# Patient Record
Sex: MALE
[De-identification: names, ages, dates, MRNs, and addresses within clinical notes are randomized per-mention and may not be internally consistent; named-entity substitution may affect disease eponyms.]

---

## 2018-10-30 PROBLEM — Z00.00 ENCOUNTER FOR PREVENTIVE HEALTH EXAMINATION: Status: ACTIVE | Noted: 2018-10-30

## 2018-11-21 ENCOUNTER — RECORD ABSTRACTING (OUTPATIENT)
Age: 83
End: 2018-11-21

## 2018-11-21 DIAGNOSIS — L98.9 DISORDER OF THE SKIN AND SUBCUTANEOUS TISSUE, UNSPECIFIED: ICD-10-CM

## 2018-11-21 DIAGNOSIS — R35.1 NOCTURIA: ICD-10-CM

## 2018-11-21 DIAGNOSIS — N62 HYPERTROPHY OF BREAST: ICD-10-CM

## 2018-11-21 RX ORDER — ESOMEPRAZOLE MAGNESIUM 40 MG/1
40 CAPSULE, DELAYED RELEASE ORAL DAILY
Refills: 0 | Status: ACTIVE | COMMUNITY

## 2018-11-21 RX ORDER — MULTIVITAMIN
TABLET ORAL DAILY
Refills: 0 | Status: ACTIVE | COMMUNITY

## 2018-11-21 RX ORDER — PNV NO.95/FERROUS FUM/FOLIC AC 28MG-0.8MG
100 TABLET ORAL DAILY
Refills: 0 | Status: ACTIVE | COMMUNITY

## 2018-11-21 RX ORDER — CHOLECALCIFEROL (VITAMIN D3) 50 MCG
50 MCG TABLET ORAL DAILY
Refills: 0 | Status: ACTIVE | COMMUNITY

## 2018-12-10 ENCOUNTER — APPOINTMENT (OUTPATIENT)
Dept: GERIATRICS | Facility: HOME HEALTH | Age: 83
End: 2018-12-10
Payer: MEDICARE

## 2018-12-10 PROCEDURE — 99349 HOME/RES VST EST MOD MDM 40: CPT

## 2018-12-15 VITALS
SYSTOLIC BLOOD PRESSURE: 130 MMHG | DIASTOLIC BLOOD PRESSURE: 80 MMHG | OXYGEN SATURATION: 94 % | HEART RATE: 84 BPM | WEIGHT: 173 LBS

## 2018-12-28 ENCOUNTER — APPOINTMENT (OUTPATIENT)
Dept: GERIATRICS | Facility: HOME HEALTH | Age: 83
End: 2018-12-28

## 2019-01-06 ENCOUNTER — APPOINTMENT (OUTPATIENT)
Dept: GERIATRICS | Facility: HOME HEALTH | Age: 84
End: 2019-01-06
Payer: MEDICARE

## 2019-01-06 PROCEDURE — 99349 HOME/RES VST EST MOD MDM 40: CPT

## 2019-01-07 ENCOUNTER — APPOINTMENT (OUTPATIENT)
Dept: GERIATRICS | Facility: HOME HEALTH | Age: 84
End: 2019-01-07

## 2019-01-08 ENCOUNTER — RX RENEWAL (OUTPATIENT)
Age: 84
End: 2019-01-08

## 2019-01-21 VITALS
WEIGHT: 170.5 LBS | OXYGEN SATURATION: 92 % | SYSTOLIC BLOOD PRESSURE: 140 MMHG | HEART RATE: 60 BPM | DIASTOLIC BLOOD PRESSURE: 80 MMHG

## 2019-01-21 NOTE — HISTORY OF PRESENT ILLNESS
[FreeTextEntry1] : pt is a 89 year old man\par has been irritable "terrible mood"\par psychiatrist has not been back to visit\par but psychologist is helpful\par needs renewal of xanax\par asks me to cut nails\par seeing pain dr for shot in back again tomorrow\par ?lumbar support\par \par has chronic cough\par GERD\par burning in chest\par \par \par gets constipated and then hard to urinate\par used two ducolax\par \par to get PT and speech therapist\par \par ankle edema - mild

## 2019-01-21 NOTE — ASSESSMENT
[FreeTextEntry1] : to see back pain specialist\par \par getting Pt and swallow therapy\par \par BP stable\par \par continue psych meds

## 2019-01-21 NOTE — PHYSICAL EXAM
[General Appearance - Alert] : alert [General Appearance - In No Acute Distress] : in no acute distress [General Appearance - Well Nourished] : well nourished [General Appearance - Well Developed] : well developed [General Appearance - Well-Appearing] : healthy appearing [Sclera] : the sclera and conjunctiva were normal [PERRL With Normal Accommodation] : pupils were equal in size, round, and reactive to light [Extraocular Movements] : extraocular movements were intact [Normal Oral Mucosa] : normal oral mucosa [No Oral Pallor] : no oral pallor [No Oral Cyanosis] : no oral cyanosis [Outer Ear] : the ears and nose were normal in appearance [Oropharynx] : The oropharynx was normal [Neck Appearance] : the appearance of the neck was normal [Neck Cervical Mass (___cm)] : no neck mass was observed [Jugular Venous Distention Increased] : there was no jugular-venous distention [Thyroid Diffuse Enlargement] : the thyroid was not enlarged [Thyroid Nodule] : there were no palpable thyroid nodules [Auscultation Breath Sounds / Voice Sounds] : lungs were clear to auscultation bilaterally [Heart Rate And Rhythm] : heart rate was normal and rhythm regular [Heart Sounds] : normal S1 and S2 [Heart Sounds Gallop] : no gallops [Murmurs] : no murmurs [Heart Sounds Pericardial Friction Rub] : no pericardial rub [Bowel Sounds] : normal bowel sounds [Abdomen Soft] : soft [Abdomen Tenderness] : non-tender [Abdomen Mass (___ Cm)] : no abdominal mass palpated [Skin Color & Pigmentation] : normal skin color and pigmentation [Skin Turgor] : normal skin turgor [] : no rash [FreeTextEntry1] : few bruises - toe nails clipped by me today [No Focal Deficits] : no focal deficits

## 2019-01-21 NOTE — REVIEW OF SYSTEMS
[As noted in HPI] : as noted in HPI [Cough] : cough [Heartburn] : heartburn [As Noted in HPI] : as noted in HPI [Negative] : Heme/Lymph [FreeTextEntry1] : depression

## 2019-02-01 ENCOUNTER — APPOINTMENT (OUTPATIENT)
Dept: GERIATRICS | Facility: HOME HEALTH | Age: 84
End: 2019-02-01
Payer: MEDICARE

## 2019-02-01 PROCEDURE — 99349 HOME/RES VST EST MOD MDM 40: CPT

## 2019-02-03 VITALS
DIASTOLIC BLOOD PRESSURE: 70 MMHG | OXYGEN SATURATION: 92 % | HEART RATE: 61 BPM | SYSTOLIC BLOOD PRESSURE: 140 MMHG | WEIGHT: 169.5 LBS

## 2019-02-03 NOTE — REVIEW OF SYSTEMS
[Loss Of Hearing] : hearing loss [Anxiety] : anxiety [Depression] : depression [Emotional Problems] : emotional problems [Negative] : Gastrointestinal

## 2019-02-05 NOTE — HISTORY OF PRESENT ILLNESS
[FreeTextEntry1] : pt is a 89 year old man\par has been irritable "terrible mood" - worries\par psychiatrist has not been back to visit\par but psychologist is helpful\par needs renewal of xanax\par asks me to cut nails\par saw pain dr for shot in back again - epidural and\par trigger point shot was very helpful\par went to pain management in [place] - helpful\par \par has chronic cough\par GERD\par burning in chest\par \par \par gets constipated and then hard to urinate\par used two ducolax\par \par to get PT and speech therapist\par \par ankle edema - mild

## 2019-02-05 NOTE — PHYSICAL EXAM
[General Appearance - Alert] : alert [General Appearance - In No Acute Distress] : in no acute distress [General Appearance - Well Nourished] : well nourished [General Appearance - Well Developed] : well developed [General Appearance - Well-Appearing] : healthy appearing [Sclera] : the sclera and conjunctiva were normal [PERRL With Normal Accommodation] : pupils were equal in size, round, and reactive to light [Extraocular Movements] : extraocular movements were intact [Neck Appearance] : the appearance of the neck was normal [Neck Cervical Mass (___cm)] : no neck mass was observed [Jugular Venous Distention Increased] : there was no jugular-venous distention [Thyroid Diffuse Enlargement] : the thyroid was not enlarged [Thyroid Nodule] : there were no palpable thyroid nodules [Auscultation Breath Sounds / Voice Sounds] : lungs were clear to auscultation bilaterally [Heart Rate And Rhythm] : heart rate was normal and rhythm regular [Heart Sounds] : normal S1 and S2 [Heart Sounds Gallop] : no gallops [Murmurs] : no murmurs [Heart Sounds Pericardial Friction Rub] : no pericardial rub [Bowel Sounds] : normal bowel sounds [Abdomen Soft] : soft [Abdomen Tenderness] : non-tender [Abdomen Mass (___ Cm)] : no abdominal mass palpated [Abnormal Walk] : normal gait [Nail Clubbing] : no clubbing  or cyanosis of the fingernails [Musculoskeletal - Swelling] : no joint swelling seen [Motor Tone] : muscle strength and tone were normal [Skin Color & Pigmentation] : normal skin color and pigmentation [Skin Turgor] : normal skin turgor [] : no rash [No Focal Deficits] : no focal deficits [FreeTextEntry1] : few bruises - toe nails clipped by me today

## 2019-04-12 ENCOUNTER — APPOINTMENT (OUTPATIENT)
Dept: GERIATRICS | Facility: HOME HEALTH | Age: 84
End: 2019-04-12
Payer: MEDICARE

## 2019-04-12 PROCEDURE — 99348 HOME/RES VST EST LOW MDM 30: CPT

## 2019-04-14 VITALS
SYSTOLIC BLOOD PRESSURE: 140 MMHG | WEIGHT: 173 LBS | OXYGEN SATURATION: 95 % | DIASTOLIC BLOOD PRESSURE: 80 MMHG | HEART RATE: 62 BPM

## 2019-04-14 NOTE — PHYSICAL EXAM
[General Appearance - Alert] : alert [General Appearance - In No Acute Distress] : in no acute distress [General Appearance - Well Nourished] : well nourished [General Appearance - Well Developed] : well developed [General Appearance - Well-Appearing] : healthy appearing [Sclera] : the sclera and conjunctiva were normal [PERRL With Normal Accommodation] : pupils were equal in size, round, and reactive to light [Neck Appearance] : the appearance of the neck was normal [Extraocular Movements] : extraocular movements were intact [Neck Cervical Mass (___cm)] : no neck mass was observed [Jugular Venous Distention Increased] : there was no jugular-venous distention [Thyroid Diffuse Enlargement] : the thyroid was not enlarged [Thyroid Nodule] : there were no palpable thyroid nodules [Auscultation Breath Sounds / Voice Sounds] : lungs were clear to auscultation bilaterally [Heart Rate And Rhythm] : heart rate was normal and rhythm regular [Heart Sounds] : normal S1 and S2 [Heart Sounds Gallop] : no gallops [Murmurs] : no murmurs [Heart Sounds Pericardial Friction Rub] : no pericardial rub [Bowel Sounds] : normal bowel sounds [Abdomen Soft] : soft [Abdomen Tenderness] : non-tender [Abdomen Mass (___ Cm)] : no abdominal mass palpated [No CVA Tenderness] : no ~M costovertebral angle tenderness [No Spinal Tenderness] : no spinal tenderness [Abnormal Walk] : normal gait [Nail Clubbing] : no clubbing  or cyanosis of the fingernails [Musculoskeletal - Swelling] : no joint swelling seen [Motor Tone] : muscle strength and tone were normal [Skin Color & Pigmentation] : normal skin color and pigmentation [Skin Turgor] : normal skin turgor [] : no rash [No Focal Deficits] : no focal deficits [FreeTextEntry1] : anxious, depressed

## 2019-04-14 NOTE — REVIEW OF SYSTEMS
[Feeling Poorly] : feeling poorly [Feeling Tired] : feeling tired [Loss Of Hearing] : hearing loss [Arthralgias] : arthralgias [Dizziness] : dizziness [Limb Weakness] : limb weakness [Difficulty Walking] : difficulty walking [Anxiety] : anxiety [Depression] : depression [Emotional Problems] : emotional problems [Negative] : Heme/Lymph [Recent Weight Gain (___ Lbs)] : no recent weight gain [Recent Weight Loss (___ Lbs)] : no recent weight loss

## 2019-04-14 NOTE — HISTORY OF PRESENT ILLNESS
[FreeTextEntry1] : pt is a 89 year old man\par has been irritable "terrible mood" - worries\par psychiatrist has not been back to visit\par but psychologist is helpful\par needs renewal of xanax\par asks me to cut nails\par saw pain dr for shot in back again - epidural and\par trigger point shot was very helpful\par went to pain management in [place] - helpful\par \par has chronic cough\par GERD\par burning in chest\par \par \par gets constipated and then hard to urinate\par used two ducolax\par \par to get PT and speech therapist\par \par ankle edema - mild\par \par 4/12/19\par on arrival pt c/o 20 mins of feeling tingly all over - face, extremiities\par also numbness of neck for years\par this resolved while I was there

## 2019-04-14 NOTE — ASSESSMENT
[FreeTextEntry1] : will order labs at home\par back is better\par BP stable\par \par pt is anxious and depressed\par felt better after visit\par does have psychiatrist and psychologist

## 2019-04-18 ENCOUNTER — RX RENEWAL (OUTPATIENT)
Age: 84
End: 2019-04-18

## 2019-04-21 ENCOUNTER — APPOINTMENT (OUTPATIENT)
Dept: GERIATRICS | Facility: HOME HEALTH | Age: 84
End: 2019-04-21
Payer: MEDICARE

## 2019-04-21 PROCEDURE — 99349 HOME/RES VST EST MOD MDM 40: CPT

## 2019-04-22 VITALS — OXYGEN SATURATION: 94 % | DIASTOLIC BLOOD PRESSURE: 70 MMHG | SYSTOLIC BLOOD PRESSURE: 132 MMHG | HEART RATE: 60 BPM

## 2019-04-22 RX ORDER — METOPROLOL SUCCINATE 25 MG/1
25 TABLET, EXTENDED RELEASE ORAL DAILY
Qty: 90 | Refills: 3 | Status: DISCONTINUED | COMMUNITY
End: 2019-04-22

## 2019-04-22 NOTE — REVIEW OF SYSTEMS
[SOB on Exertion] : shortness of breath during exertion [Constipation] : constipation [Arthralgias] : arthralgias [Anxiety] : anxiety [Sleep Disturbances] : sleep disturbances [Depression] : depression [Muscle Weakness] : muscle weakness [Easy Bruising] : a tendency for easy bruising [Negative] : Genitourinary

## 2019-04-22 NOTE — ASSESSMENT
[FreeTextEntry1] : reviewed medical records from [place]\par scanned in to chart\par \par checked BP with machine  obtained simiiliar readiings several times\par pt knows how to use\par \par pt had negative nuclear stress coco\par currently feels well\par echo wnl

## 2019-04-22 NOTE — HISTORY OF PRESENT ILLNESS
[FreeTextEntry1] : pt is a 89 year old man\par has been irritable "terrible mood" - worries\par psychiatrist has not been back to visit\par but psychologist is helpful\par needs renewal of xanax\par asks me to cut nails\par saw pain dr for shot in back again - epidural and\par trigger point shot was very helpful\par went to pain management in [place] - helpful\par \par has chronic cough\par GERD\par burning in chest\par \par \par gets constipated and then hard to urinate\par used two ducolax\par \par to get PT and speech therapist\par \par ankle edema - mild\par \par 4/12/19\par on arrival pt c/o 20 mins of feeling tingly all over - face, extremiities\par also numbness of neck for years\par this resolved while I was there\par \par 4/21/19\par on 4/17/19\par pt had mild chest pain 1-2/10\par aide called 911\par taken to 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

## 2019-05-24 ENCOUNTER — APPOINTMENT (OUTPATIENT)
Dept: GERIATRICS | Facility: HOME HEALTH | Age: 84
End: 2019-05-24
Payer: MEDICARE

## 2019-05-24 PROCEDURE — 99349 HOME/RES VST EST MOD MDM 40: CPT

## 2019-05-27 VITALS
OXYGEN SATURATION: 94 % | SYSTOLIC BLOOD PRESSURE: 140 MMHG | DIASTOLIC BLOOD PRESSURE: 70 MMHG | HEART RATE: 64 BPM | WEIGHT: 174.5 LBS

## 2019-05-27 NOTE — ASSESSMENT
[FreeTextEntry1] : reviewed medical records from [place]\par scanned in to chart\par \par checked BP with machine  obtained simiiliar readiings several times\par pt knows how to use\par \par pt had negative nuclear stress coco\par currently feels well\par echo wnl\par \par 5/24/19\par pt is doing well\par no chest pain\par to see cardiogist next week\par nails clipped

## 2019-05-27 NOTE — HISTORY OF PRESENT ILLNESS
[FreeTextEntry1] : pt is a 89 year old man\par has been irritable "terrible mood" - worries\par psychiatrist has not been back to visit\par but psychologist is helpful\par needs renewal of xanax\par asks me to cut nails\par saw pain dr for shot in back again - epidural and\par trigger point shot was very helpful\par went to pain management in [place] - helpful\par \par has chronic cough\par GERD\par burning in chest\par \par \par gets constipated and then hard to urinate\par used two ducolax\par \par to get PT and speech therapist\par \par ankle edema - mild\par \par 4/12/19\par on arrival pt c/o 20 mins of feeling tingly all over - face, extremiities\par also numbness of neck for years\par this resolved while I was there\par \par 4/21/19\par on 4/17/19\par pt had mild chest pain 1-2/10\par aide called 911\par taken to [facility]\par EMS noted  in and out of a fib\par at [place] NSR but elevated troponin to 1.8 peak\par no EKG changes\par BP was high they added amloidipine 5, cozaar 100 and increased toprol from 12.5 to 25 bid\par but hospital f/up yielded BP 95/60 - amlodipiine stopped, cozaar lowered to 50 and toprol back to 25 mg\par currently feels well\par \par In hospital records reviewed\par nuclear stress negative for ischemia\par Echo normal with EF 75%\par no more a fib on monitors\par \par 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

## 2019-05-27 NOTE — PHYSICAL EXAM
[General Appearance - In No Acute Distress] : in no acute distress [General Appearance - Alert] : alert [General Appearance - Well Nourished] : well nourished [General Appearance - Well-Appearing] : healthy appearing [General Appearance - Well Developed] : well developed [Extraocular Movements] : extraocular movements were intact [Sclera] : the sclera and conjunctiva were normal [PERRL With Normal Accommodation] : pupils were equal in size, round, and reactive to light [Neck Appearance] : the appearance of the neck was normal [Neck Cervical Mass (___cm)] : no neck mass was observed [Jugular Venous Distention Increased] : there was no jugular-venous distention [Outer Ear] : the ears and nose were normal in appearance [Thyroid Diffuse Enlargement] : the thyroid was not enlarged [Thyroid Nodule] : there were no palpable thyroid nodules [Auscultation Breath Sounds / Voice Sounds] : lungs were clear to auscultation bilaterally [Heart Rate And Rhythm] : heart rate was normal and rhythm regular [Heart Sounds] : normal S1 and S2 [Heart Sounds Gallop] : no gallops [Murmurs] : no murmurs [Heart Sounds Pericardial Friction Rub] : no pericardial rub [No CVA Tenderness] : no ~M costovertebral angle tenderness [Involuntary Movements] : no involuntary movements were seen [No Spinal Tenderness] : no spinal tenderness [FreeTextEntry1] : uses walker [Skin Color & Pigmentation] : normal skin color and pigmentation [Skin Turgor] : normal skin turgor [] : no rash [No Focal Deficits] : no focal deficits

## 2019-05-27 NOTE — REVIEW OF SYSTEMS
[Loss Of Hearing] : hearing loss [Difficulty Walking] : difficulty walking [Anxiety] : anxiety [Constipation] : constipation [Easy Bruising] : a tendency for easy bruising [Depression] : depression [Negative] : Integumentary [FreeTextEntry4] : uses amplifier [Easy Bleeding] : no tendency for easy bleeding

## 2019-06-28 ENCOUNTER — APPOINTMENT (OUTPATIENT)
Dept: GERIATRICS | Facility: HOME HEALTH | Age: 84
End: 2019-06-28
Payer: MEDICARE

## 2019-06-28 PROCEDURE — 99349 HOME/RES VST EST MOD MDM 40: CPT

## 2019-07-04 ENCOUNTER — RX RENEWAL (OUTPATIENT)
Age: 84
End: 2019-07-04

## 2019-07-04 VITALS
OXYGEN SATURATION: 92 % | DIASTOLIC BLOOD PRESSURE: 60 MMHG | WEIGHT: 175.8 LBS | HEART RATE: 64 BPM | SYSTOLIC BLOOD PRESSURE: 125 MMHG

## 2019-07-04 NOTE — PHYSICAL EXAM
[General Appearance - In No Acute Distress] : in no acute distress [General Appearance - Alert] : alert [General Appearance - Well Nourished] : well nourished [Sclera] : the sclera and conjunctiva were normal [General Appearance - Well Developed] : well developed [General Appearance - Well-Appearing] : healthy appearing [Extraocular Movements] : extraocular movements were intact [Outer Ear] : the ears and nose were normal in appearance [PERRL With Normal Accommodation] : pupils were equal in size, round, and reactive to light [Jugular Venous Distention Increased] : there was no jugular-venous distention [Neck Cervical Mass (___cm)] : no neck mass was observed [Neck Appearance] : the appearance of the neck was normal [Auscultation Breath Sounds / Voice Sounds] : lungs were clear to auscultation bilaterally [Thyroid Diffuse Enlargement] : the thyroid was not enlarged [Thyroid Nodule] : there were no palpable thyroid nodules [Heart Rate And Rhythm] : heart rate was normal and rhythm regular [Murmurs] : no murmurs [Heart Sounds Gallop] : no gallops [Heart Sounds] : normal S1 and S2 [Heart Sounds Pericardial Friction Rub] : no pericardial rub [Bowel Sounds] : normal bowel sounds [Abdomen Tenderness] : non-tender [Abdomen Mass (___ Cm)] : no abdominal mass palpated [Abdomen Soft] : soft [No CVA Tenderness] : no ~M costovertebral angle tenderness [No Spinal Tenderness] : no spinal tenderness [Involuntary Movements] : no involuntary movements were seen [] : no rash [Skin Turgor] : normal skin turgor [Skin Color & Pigmentation] : normal skin color and pigmentation [No Focal Deficits] : no focal deficits [FreeTextEntry1] : depressed

## 2019-07-04 NOTE — REVIEW OF SYSTEMS
[Eyesight Problems] : eyesight problems [Feeling Poorly] : feeling poorly [Sleep Disturbances] : sleep disturbances [Loss Of Hearing] : hearing loss [Difficulty Walking] : difficulty walking [Anxiety] : anxiety [Depression] : depression [Emotional Problems] : emotional problems [Muscle Weakness] : muscle weakness [Easy Bruising] : a tendency for easy bruising [Negative] : Musculoskeletal [de-identified] : sleeping a lot - depressed - dreading each day [FreeTextEntry5] : no chest pain

## 2019-07-04 NOTE — HISTORY OF PRESENT ILLNESS
[FreeTextEntry1] : pt is a 89 year old man\par has been irritable "terrible mood" - worries\par psychiatrist has not been back to visit\par but psychologist is helpful\par needs renewal of xanax\par asks me to cut nails\par saw pain dr for shot in back again - epidural and\par trigger point shot was very helpful\par went to pain management in [place] - helpful\par \par has chronic cough\par GERD\par burning in chest\par \par \par gets constipated and then hard to urinate\par used two ducolax\par \par to get PT and speech therapist\par \par ankle edema - mild\par \par 4/12/19\par on arrival pt c/o 20 mins of feeling tingly all over - face, extremiities\par also numbness of neck for years\par this resolved while I was there\par \par 4/21/19\par on 4/17/19\par pt had mild chest pain 1-2/10\par aide called 911\par taken to [facility]\par EMS noted  in and out of a fib\par at [place] NSR but elevated troponin to 1.8 peak\par no EKG changes\par BP was high they added amloidipine 5, cozaar 100 and increased toprol from 12.5 to 25 bid\par but hospital f/up yielded BP 95/60 - amlodipiine stopped, cozaar lowered to 50 and toprol back to 25 mg\par currently feels well\par \par In hospital records reviewed\par nuclear stress negative for ischemia\par Echo normal with EF 75%\par no more a fib on monitors\par \par 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\par \par 6/28/19\par pt has been more depressed\par spoke to psychiatrist who spoke to rose mary - suggested increasing effexor to 2 and 1\par asked me to prescribe\par has a psychologist comes weekly to talk\par pt is hopeless, irritable - wore headphones to dinner to block out noise

## 2019-07-04 NOTE — ASSESSMENT
[FreeTextEntry1] : reviewed medical records from [place]\par scanned in to chart\par \par checked BP with machine  obtained simiiliar readiings several times\par pt knows how to use\par \par pt had negative nuclear stress coco\par currently feels well\par echo wnl\par \par 5/24/19\par pt is doing well\par no chest pain\par to see cardiogist next week\par nails clipped\par \par 6/28/19\par pt is depressed\par will increase effexor\par

## 2019-08-08 ENCOUNTER — APPOINTMENT (OUTPATIENT)
Dept: GERIATRICS | Facility: HOME HEALTH | Age: 84
End: 2019-08-08
Payer: MEDICARE

## 2019-08-08 VITALS
DIASTOLIC BLOOD PRESSURE: 65 MMHG | OXYGEN SATURATION: 94 % | SYSTOLIC BLOOD PRESSURE: 130 MMHG | WEIGHT: 174.4 LBS | HEART RATE: 64 BPM

## 2019-08-08 DIAGNOSIS — Z86.69 PERSONAL HISTORY OF OTHER DISEASES OF THE NERVOUS SYSTEM AND SENSE ORGANS: ICD-10-CM

## 2019-08-08 DIAGNOSIS — Z98.61 CORONARY ANGIOPLASTY STATUS: ICD-10-CM

## 2019-08-08 PROCEDURE — 99349 HOME/RES VST EST MOD MDM 40: CPT

## 2019-08-08 NOTE — PHYSICAL EXAM
[General Appearance - Alert] : alert [General Appearance - In No Acute Distress] : in no acute distress [General Appearance - Well Nourished] : well nourished [General Appearance - Well Developed] : well developed [General Appearance - Well-Appearing] : healthy appearing [Sclera] : the sclera and conjunctiva were normal [PERRL With Normal Accommodation] : pupils were equal in size, round, and reactive to light [Extraocular Movements] : extraocular movements were intact [Outer Ear] : the ears and nose were normal in appearance [Neck Appearance] : the appearance of the neck was normal [Neck Cervical Mass (___cm)] : no neck mass was observed [Jugular Venous Distention Increased] : there was no jugular-venous distention [Thyroid Diffuse Enlargement] : the thyroid was not enlarged [Thyroid Nodule] : there were no palpable thyroid nodules [Auscultation Breath Sounds / Voice Sounds] : lungs were clear to auscultation bilaterally [Heart Rate And Rhythm] : heart rate was normal and rhythm regular [Heart Sounds] : normal S1 and S2 [Heart Sounds Gallop] : no gallops [Murmurs] : no murmurs [Heart Sounds Pericardial Friction Rub] : no pericardial rub [Abdomen Soft] : soft [Abdomen Tenderness] : non-tender [Abdomen Mass (___ Cm)] : no abdominal mass palpated [Involuntary Movements] : no involuntary movements were seen [Skin Color & Pigmentation] : normal skin color and pigmentation [Skin Turgor] : normal skin turgor [] : no rash [No Focal Deficits] : no focal deficits [FreeTextEntry1] : depressed

## 2019-08-08 NOTE — CURRENT MEDS
[Medication Reconciliation Completed] : Medication reconciliation completed [High Risk Medications Reviewed] : High risk medications reviewed [Delivered by local pharmacy] : delivered by local Pharmacy [Mail Order] : by mail [Reports Changes In Medication (including OTC)] : Patient reports no changes in medication [] : does not miss any medication doses [de-identified] : daughter

## 2019-08-08 NOTE — REVIEW OF SYSTEMS
[Feeling Poorly] : feeling poorly [Feeling Tired] : feeling tired [Eyesight Problems] : eyesight problems [Loss Of Hearing] : hearing loss [Heartburn] : heartburn [Hesitancy] : urinary hesitancy [Arthralgias] : arthralgias [Limb Weakness] : limb weakness [Difficulty Walking] : difficulty walking [Anxiety] : anxiety [Muscle Weakness] : muscle weakness [Easy Bruising] : a tendency for easy bruising [Negative] : Integumentary

## 2019-08-08 NOTE — HISTORY OF PRESENT ILLNESS
[FreeTextEntry1] : pt is a 89 year old man\par has been irritable "terrible mood" - worries\par psychiatrist has not been back to visit\par but psychologist is helpful\par needs renewal of xanax\par asks me to cut nails\par saw pain dr for shot in back again - epidural and\par trigger point shot was very helpful\par went to pain management in [place] - helpful\par \par has chronic cough\par GERD\par burning in chest\par \par \par gets constipated and then hard to urinate\par used two ducolax\par \par to get PT and speech therapist\par \par ankle edema - mild\par \par 4/12/19\par on arrival pt c/o 20 mins of feeling tingly all over - face, extremiities\par also numbness of neck for years\par this resolved while I was there\par \par 4/21/19\par on 4/17/19\par pt had mild chest pain 1-2/10\par aide called 911\par taken to [facility]\par EMS noted  in and out of a fib\par at [place] NSR but elevated troponin to 1.8 peak\par no EKG changes\par BP was high they added amloidipine 5, cozaar 100 and increased toprol from 12.5 to 25 bid\par but hospital f/up yielded BP 95/60 - amlodipiine stopped, cozaar lowered to 50 and toprol back to 25 mg\par currently feels well\par \par In hospital records reviewed\par nuclear stress negative for ischemia\par Echo normal with EF 75%\par no more a fib on monitors\par \par 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\par \par 6/28/19\par pt has been more depressed\par spoke to psychiatrist who spoke to rose mary - suggested increasing effexor to 2 and 1\par asked me to prescribe\par has a psychologist comes weekly to talk\par pt is hopeless, irritable - wore headphones to dinner to block out noise\par \par 8/8/19\par psych dr never followed up\par but pt feels better on new meds\par has psychologist\par no longer depressed just unhappy\par trouble walking to meals\par does not like residence\par 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

## 2019-08-08 NOTE — ASSESSMENT
[FreeTextEntry1] : reviewed medical records from [place]\par scanned in to chart\par \par checked BP with machine  obtained simiiliar readiings several times\par pt knows how to use\par \par pt had negative nuclear stress coco\par currently feels well\par echo wnl\par \par 5/24/19\par pt is doing well\par no chest pain\par to see cardiogist next week\par nails clipped\par \par 8/8/19\par doing better on increased efffexor\par BP is good\par

## 2019-09-13 ENCOUNTER — APPOINTMENT (OUTPATIENT)
Dept: GERIATRICS | Facility: HOME HEALTH | Age: 84
End: 2019-09-13
Payer: MEDICARE

## 2019-09-13 PROCEDURE — 99349 HOME/RES VST EST MOD MDM 40: CPT

## 2019-09-13 PROCEDURE — G0008: CPT

## 2019-09-13 PROCEDURE — 90662 IIV NO PRSV INCREASED AG IM: CPT

## 2019-09-15 VITALS
OXYGEN SATURATION: 92 % | RESPIRATION RATE: 83 BRPM | SYSTOLIC BLOOD PRESSURE: 138 MMHG | DIASTOLIC BLOOD PRESSURE: 70 MMHG | WEIGHT: 177.6 LBS

## 2019-09-16 NOTE — PHYSICAL EXAM
[General Appearance - Alert] : alert [General Appearance - In No Acute Distress] : in no acute distress [General Appearance - Well Nourished] : well nourished [General Appearance - Well Developed] : well developed [General Appearance - Well-Appearing] : healthy appearing [PERRL With Normal Accommodation] : pupils were equal in size, round, and reactive to light [Sclera] : the sclera and conjunctiva were normal [Extraocular Movements] : extraocular movements were intact [Outer Ear] : the ears and nose were normal in appearance [Neck Appearance] : the appearance of the neck was normal [Neck Cervical Mass (___cm)] : no neck mass was observed [Jugular Venous Distention Increased] : there was no jugular-venous distention [Thyroid Diffuse Enlargement] : the thyroid was not enlarged [Thyroid Nodule] : there were no palpable thyroid nodules [Auscultation Breath Sounds / Voice Sounds] : lungs were clear to auscultation bilaterally [Heart Rate And Rhythm] : heart rate was normal and rhythm regular [Heart Sounds Gallop] : no gallops [Heart Sounds] : normal S1 and S2 [Murmurs] : no murmurs [Heart Sounds Pericardial Friction Rub] : no pericardial rub [Abdomen Soft] : soft [Abdomen Tenderness] : non-tender [Abdomen Mass (___ Cm)] : no abdominal mass palpated [Cervical Lymph Nodes Enlarged Posterior Bilaterally] : posterior cervical [Cervical Lymph Nodes Enlarged Anterior Bilaterally] : anterior cervical [No CVA Tenderness] : no ~M costovertebral angle tenderness [Involuntary Movements] : no involuntary movements were seen [No Spinal Tenderness] : no spinal tenderness [Skin Color & Pigmentation] : normal skin color and pigmentation [Skin Turgor] : normal skin turgor [] : no rash [FreeTextEntry1] : few bruises

## 2019-09-16 NOTE — REVIEW OF SYSTEMS
[Feeling Poorly] : feeling poorly [Recent Weight Gain (___ Lbs)] : recent [unfilled] ~Ulb weight gain [Loss Of Hearing] : hearing loss [Arthralgias] : arthralgias [Limb Weakness] : limb weakness [Difficulty Walking] : difficulty walking [Sleep Disturbances] : sleep disturbances [Anxiety] : anxiety [Emotional Problems] : emotional problems [Depression] : depression [Negative] : Heme/Lymph [FreeTextEntry8] : eats prunes to help constipation [FreeTextEntry2] : eating walnuts

## 2019-09-16 NOTE — HISTORY OF PRESENT ILLNESS
[FreeTextEntry1] : pt is a 89 year old man\par has been irritable "terrible mood" - worries\par psychiatrist has not been back to visit\par but psychologist is helpful\par needs renewal of xanax\par asks me to cut nails\par saw pain dr for shot in back again - epidural and\par trigger point shot was very helpful\par went to pain management in [place] - helpful\par \par has chronic cough\par GERD\par burning in chest\par \par \par gets constipated and then hard to urinate\par used two ducolax\par \par to get PT and speech therapist\par \par ankle edema - mild\par \par 4/12/19\par on arrival pt c/o 20 mins of feeling tingly all over - face, extremiities\par also numbness of neck for years\par this resolved while I was there\par \par 4/21/19\par on 4/17/19\par pt had mild chest pain 1-2/10\par aide called 911\par taken to [facility]\par EMS noted  in and out of a fib\par at [place] NSR but elevated troponin to 1.8 peak\par no EKG changes\par BP was high they added amloidipine 5, cozaar 100 and increased toprol from 12.5 to 25 bid\par but hospital f/up yielded BP 95/60 - amlodipiine stopped, cozaar lowered to 50 and toprol back to 25 mg\par currently feels well\par \par In hospital records reviewed\par nuclear stress negative for ischemia\par Echo normal with EF 75%\par no more a fib on monitors\par \par 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\par \par 6/28/19\par pt has been more depressed\par spoke to psychiatrist who spoke to rose mary - suggested increasing effexor to 2 and 1\par asked me to prescribe\par has a psychologist comes weekly to talk\par pt is hopeless, irritable - wore headphones to dinner to block out noise\par \par 8/8/19\par psych dr never followed up\par but pt feels better on new meds\par has psychologist\par no longer depressed just unhappy\par trouble walking to meals\par does not like residence\par 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

## 2019-10-25 ENCOUNTER — APPOINTMENT (OUTPATIENT)
Dept: GERIATRICS | Facility: HOME HEALTH | Age: 84
End: 2019-10-25
Payer: MEDICARE

## 2019-10-25 DIAGNOSIS — Z86.79 PERSONAL HISTORY OF OTHER DISEASES OF THE CIRCULATORY SYSTEM: ICD-10-CM

## 2019-10-25 PROCEDURE — 99348 HOME/RES VST EST LOW MDM 30: CPT

## 2019-10-27 VITALS
DIASTOLIC BLOOD PRESSURE: 75 MMHG | OXYGEN SATURATION: 95 % | SYSTOLIC BLOOD PRESSURE: 130 MMHG | WEIGHT: 175.6 LBS | HEART RATE: 59 BPM

## 2019-10-27 PROBLEM — Z86.79 HISTORY OF CORONARY ARTERY DISEASE: Status: RESOLVED | Noted: 2018-11-21 | Resolved: 2019-10-27

## 2019-10-27 NOTE — PHYSICAL EXAM
[General Appearance - Alert] : alert [General Appearance - In No Acute Distress] : in no acute distress [General Appearance - Well Nourished] : well nourished [General Appearance - Well Developed] : well developed [General Appearance - Well-Appearing] : healthy appearing [Sclera] : the sclera and conjunctiva were normal [PERRL With Normal Accommodation] : pupils were equal in size, round, and reactive to light [Extraocular Movements] : extraocular movements were intact [Outer Ear] : the ears and nose were normal in appearance [Neck Appearance] : the appearance of the neck was normal [Neck Cervical Mass (___cm)] : no neck mass was observed [Jugular Venous Distention Increased] : there was no jugular-venous distention [Thyroid Diffuse Enlargement] : the thyroid was not enlarged [Thyroid Nodule] : there were no palpable thyroid nodules [Auscultation Breath Sounds / Voice Sounds] : lungs were clear to auscultation bilaterally [Heart Rate And Rhythm] : heart rate was normal and rhythm regular [Heart Sounds] : normal S1 and S2 [Heart Sounds Gallop] : no gallops [Murmurs] : no murmurs [Heart Sounds Pericardial Friction Rub] : no pericardial rub [Cervical Lymph Nodes Enlarged Posterior Bilaterally] : posterior cervical [Cervical Lymph Nodes Enlarged Anterior Bilaterally] : anterior cervical [No CVA Tenderness] : no ~M costovertebral angle tenderness [No Spinal Tenderness] : no spinal tenderness [Involuntary Movements] : no involuntary movements were seen [Skin Color & Pigmentation] : normal skin color and pigmentation [Skin Turgor] : normal skin turgor [] : no rash [No Focal Deficits] : no focal deficits [FreeTextEntry1] : spirits better

## 2019-10-27 NOTE — HISTORY OF PRESENT ILLNESS
[FreeTextEntry1] : pt is a 89 year old man\par has been irritable "terrible mood" - worries\par psychiatrist has not been back to visit\par but psychologist is helpful\par needs renewal of xanax\par asks me to cut nails\par saw pain dr for shot in back again - epidural and\par trigger point shot was very helpful\par went to pain management in [place] - helpful\par \par has chronic cough\par GERD\par burning in chest\par \par \par gets constipated and then hard to urinate\par used two ducolax\par \par to get PT and speech therapist\par \par ankle edema - mild\par \par 4/12/19\par on arrival pt c/o 20 mins of feeling tingly all over - face, extremiities\par also numbness of neck for years\par this resolved while I was there\par \par 4/21/19\par on 4/17/19\par pt had mild chest pain 1-2/10\par aide called 911\par taken to [facility]\par EMS noted  in and out of a fib\par at [place] NSR but elevated troponin to 1.8 peak\par no EKG changes\par BP was high they added amloidipine 5, cozaar 100 and increased toprol from 12.5 to 25 bid\par but hospital f/up yielded BP 95/60 - amlodipiine stopped, cozaar lowered to 50 and toprol back to 25 mg\par currently feels well\par \par In hospital records reviewed\par nuclear stress negative for ischemia\par Echo normal with EF 75%\par no more a fib on monitors\par \par 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\par \par 6/28/19\par pt has been more depressed\par spoke to psychiatrist who spoke to rose mary - suggested increasing effexor to 2 and 1\par asked me to prescribe\par has a psychologist comes weekly to talk\par pt is hopeless, irritable - wore headphones to dinner to block out noise\par \par 8/8/19\par psych dr never followed up\par but pt feels better on new meds\par has psychologist\par no longer depressed just unhappy\par trouble walking to meals\par does not like residence\par 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

## 2019-12-06 ENCOUNTER — RX RENEWAL (OUTPATIENT)
Age: 84
End: 2019-12-06

## 2019-12-14 ENCOUNTER — APPOINTMENT (OUTPATIENT)
Dept: GERIATRICS | Facility: HOME HEALTH | Age: 84
End: 2019-12-14
Payer: MEDICARE

## 2019-12-14 VITALS
DIASTOLIC BLOOD PRESSURE: 70 MMHG | HEART RATE: 67 BPM | SYSTOLIC BLOOD PRESSURE: 135 MMHG | WEIGHT: 177.2 LBS | OXYGEN SATURATION: 93 %

## 2019-12-14 DIAGNOSIS — R31.9 HEMATURIA, UNSPECIFIED: ICD-10-CM

## 2019-12-14 DIAGNOSIS — K59.01 SLOW TRANSIT CONSTIPATION: ICD-10-CM

## 2019-12-14 PROCEDURE — 99348 HOME/RES VST EST LOW MDM 30: CPT

## 2019-12-14 NOTE — ASSESSMENT
[FreeTextEntry1] : reviewed medical records from [place]\par scanned in to chart\par \par checked BP with machine  obtained simiiliar readiings several times\par pt knows how to use\par \par pt had negative nuclear stress coco\par currently feels well\par echo wnl\par \par 5/24/19\par pt is doing well\par no chest pain\par to see cardiogist next week\par nails clipped\par \par 8/8/19\par doing better on increased efffexor\par BP is good\par \par \par 10/25/19\par pt is doing better with mood\par BP under control\par no pain\par \par 12/14/19\par cysto - on weds\par cystology sent\par labs reviewed - stable\par no UTI\par BP normal

## 2019-12-14 NOTE — PHYSICAL EXAM
[General Appearance - Alert] : alert [General Appearance - In No Acute Distress] : in no acute distress [General Appearance - Well Nourished] : well nourished [General Appearance - Well-Appearing] : healthy appearing [General Appearance - Well Developed] : well developed [Sclera] : the sclera and conjunctiva were normal [PERRL With Normal Accommodation] : pupils were equal in size, round, and reactive to light [Extraocular Movements] : extraocular movements were intact [Outer Ear] : the ears and nose were normal in appearance [Examination Of The Oral Cavity] : the lips and gums were normal [Neck Appearance] : the appearance of the neck was normal [Neck Cervical Mass (___cm)] : no neck mass was observed [Jugular Venous Distention Increased] : there was no jugular-venous distention [Thyroid Diffuse Enlargement] : the thyroid was not enlarged [Thyroid Nodule] : there were no palpable thyroid nodules [Auscultation Breath Sounds / Voice Sounds] : lungs were clear to auscultation bilaterally [Heart Rate And Rhythm] : heart rate was normal and rhythm regular [Heart Sounds Gallop] : no gallops [Heart Sounds] : normal S1 and S2 [Murmurs] : no murmurs [Heart Sounds Pericardial Friction Rub] : no pericardial rub [Abdomen Soft] : soft [Abdomen Tenderness] : non-tender [Abdomen Mass (___ Cm)] : no abdominal mass palpated [No CVA Tenderness] : no ~M costovertebral angle tenderness [No Spinal Tenderness] : no spinal tenderness [Involuntary Movements] : no involuntary movements were seen [Skin Color & Pigmentation] : normal skin color and pigmentation [Skin Turgor] : normal skin turgor [] : no rash [No Focal Deficits] : no focal deficits [FreeTextEntry1] : anxious - felt reassured by end of visit

## 2019-12-14 NOTE — HISTORY OF PRESENT ILLNESS
[FreeTextEntry1] : pt is a 89 year old man\par has been irritable "terrible mood" - worries\par psychiatrist has not been back to visit\par but psychologist is helpful\par needs renewal of xanax\par asks me to cut nails\par saw pain dr for shot in back again - epidural and\par trigger point shot was very helpful\par went to pain management in [place] - helpful\par \par has chronic cough\par GERD\par burning in chest\par \par \par gets constipated and then hard to urinate\par used two ducolax\par \par to get PT and speech therapist\par \par ankle edema - mild\par \par 4/12/19\par on arrival pt c/o 20 mins of feeling tingly all over - face, extremiities\par also numbness of neck for years\par this resolved while I was there\par \par 4/21/19\par on 4/17/19\par pt had mild chest pain 1-2/10\par aide called 911\par taken to 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\par \par 6/28/19\par pt has been more depressed\par spoke to psychiatrist who spoke to rose mary - suggested increasing effexor to 2 and 1\par asked me to prescribe\par has a psychologist comes weekly to talk\par pt is hopeless, irritable - wore headphones to dinner to block out noise\par \par 8/8/19\par psych dr never followed up\par but pt feels better on new meds\par has psychologist\par no longer depressed just unhappy\par trouble walking to meals\par does not like residence\par 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\par \par 12/14/19\par awoke at 4 am on monday - to painless bloody urine\par no fever\par went to [place] ER - UA positive for rbcs, no bacteria - given ceftin\par friday went to  urologist - Dr Chandra\par scheduled for cystoscopy on weds\par asa on hold\par still has blood in urine\par no pain, no fever\par thinks 8-9 years ago also had cysto\par no h/o stones\par very anxious re possible cancer\par no anemia\par no weight loss\par otherwise fine

## 2020-01-12 ENCOUNTER — RX RENEWAL (OUTPATIENT)
Age: 85
End: 2020-01-12

## 2020-02-07 ENCOUNTER — APPOINTMENT (OUTPATIENT)
Dept: GERIATRICS | Facility: HOME HEALTH | Age: 85
End: 2020-02-07
Payer: MEDICARE

## 2020-02-07 DIAGNOSIS — N32.3 DIVERTICULUM OF BLADDER: ICD-10-CM

## 2020-02-07 DIAGNOSIS — F41.9 ANXIETY DISORDER, UNSPECIFIED: ICD-10-CM

## 2020-02-07 PROCEDURE — 99348 HOME/RES VST EST LOW MDM 30: CPT

## 2020-02-10 VITALS
SYSTOLIC BLOOD PRESSURE: 130 MMHG | WEIGHT: 173.4 LBS | DIASTOLIC BLOOD PRESSURE: 72 MMHG | OXYGEN SATURATION: 91 % | HEART RATE: 68 BPM

## 2020-02-10 PROBLEM — F41.9 ANXIETY: Status: ACTIVE | Noted: 2018-11-21

## 2020-02-10 PROBLEM — N32.3 BLADDER DIVERTICULUM: Status: ACTIVE | Noted: 2020-02-10

## 2020-02-10 NOTE — REVIEW OF SYSTEMS
[Loss Of Hearing] : hearing loss [Hesitancy] : urinary hesitancy [Arthralgias] : arthralgias [Anxiety] : anxiety [Depression] : depression [Muscle Weakness] : muscle weakness [Negative] : Heme/Lymph

## 2020-02-10 NOTE — ASSESSMENT
[FreeTextEntry1] : reviewed medical records from [place]\par scanned in to chart\par \par checked BP with machine  obtained simiiliar readiings several times\par pt knows how to use\par \par pt had negative nuclear stress coco\par currently feels well\par echo wnl\par \par 5/24/19\par pt is doing well\par no chest pain\par to see cardiogist next week\par nails clipped\par \par 8/8/19\par doing better on increased efffexor\par BP is good\par \par \par 10/25/19\par pt is doing better with mood\par BP under control\par no pain\par \par 12/14/19\par cysto - on weds\par cystology sent\par labs reviewed - stable\par no UTI\par BP normal\par \par 2/7/20\par cysto - on diverticuli - no more blood in urine\par BP stable\par no changes made

## 2020-02-10 NOTE — PHYSICAL EXAM
[General Appearance - Alert] : alert [General Appearance - In No Acute Distress] : in no acute distress [General Appearance - Well Nourished] : well nourished [General Appearance - Well Developed] : well developed [General Appearance - Well-Appearing] : healthy appearing [Sclera] : the sclera and conjunctiva were normal [PERRL With Normal Accommodation] : pupils were equal in size, round, and reactive to light [Extraocular Movements] : extraocular movements were intact [Normal Oral Mucosa] : normal oral mucosa [No Oral Pallor] : no oral pallor [No Oral Cyanosis] : no oral cyanosis [Outer Ear] : the ears and nose were normal in appearance [Oropharynx] : The oropharynx was normal [Neck Appearance] : the appearance of the neck was normal [Neck Cervical Mass (___cm)] : no neck mass was observed [Jugular Venous Distention Increased] : there was no jugular-venous distention [Thyroid Diffuse Enlargement] : the thyroid was not enlarged [Thyroid Nodule] : there were no palpable thyroid nodules [Auscultation Breath Sounds / Voice Sounds] : lungs were clear to auscultation bilaterally [Heart Rate And Rhythm] : heart rate was normal and rhythm regular [Heart Sounds] : normal S1 and S2 [Heart Sounds Gallop] : no gallops [Murmurs] : no murmurs [Heart Sounds Pericardial Friction Rub] : no pericardial rub [Abdomen Soft] : soft [Abdomen Tenderness] : non-tender [Abdomen Mass (___ Cm)] : no abdominal mass palpated [Cervical Lymph Nodes Enlarged Posterior Bilaterally] : posterior cervical [Cervical Lymph Nodes Enlarged Anterior Bilaterally] : anterior cervical [No CVA Tenderness] : no ~M costovertebral angle tenderness [No Spinal Tenderness] : no spinal tenderness [Involuntary Movements] : no involuntary movements were seen [FreeTextEntry1] : uses walker [Skin Color & Pigmentation] : normal skin color and pigmentation [Skin Turgor] : normal skin turgor [] : no rash [No Focal Deficits] : no focal deficits

## 2020-02-10 NOTE — HISTORY OF PRESENT ILLNESS
[FreeTextEntry1] : pt is a 89 year old man\par has been irritable "terrible mood" - worries\par psychiatrist has not been back to visit\par but psychologist is helpful\par needs renewal of xanax\par asks me to cut nails\par saw pain dr for shot in back again - epidural and\par trigger point shot was very helpful\par went to pain management in [place] - helpful\par \par has chronic cough\par GERD\par burning in chest\par \par \par gets constipated and then hard to urinate\par used two ducolax\par \par to get PT and speech therapist\par \par ankle edema - mild\par \par 4/12/19\par on arrival pt c/o 20 mins of feeling tingly all over - face, extremiities\par also numbness of neck for years\par this resolved while I was there\par \par 4/21/19\par on 4/17/19\par pt had mild chest pain 1-2/10\par aide called 911\par taken to [facility]\par EMS noted  in and out of a fib\par at [place] NSR but elevated troponin to 1.8 peak\par no EKG changes\par BP was high they added amloidipine 5, cozaar 100 and increased toprol from 12.5 to 25 bid\par but hospital f/up yielded BP 95/60 - amlodipiine stopped, cozaar lowered to 50 and toprol back to 25 mg\par currently feels well\par \par In hospital records reviewed\par nuclear stress negative for ischemia\par Echo normal with EF 75%\par no more a fib on monitors\par \par 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\par \par 6/28/19\par pt has been more depressed\par spoke to psychiatrist who spoke to rose mary - suggested increasing effexor to 2 and 1\par asked me to prescribe\par has a psychologist comes weekly to talk\par pt is hopeless, irritable - wore headphones to dinner to block out noise\par \par 8/8/19\par psych dr never followed up\par but pt feels better on new meds\par has psychologist\par no longer depressed just unhappy\par trouble walking to meals\par does not like residence\par 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\par \par 12/14/19\par awoke at 4 am on monday - to painless bloody urine\par no fever\par went to [place] ER - UA positive for rbcs, no bacteria - given ceftin\par friday went to  urologist - Dr Chandra\par scheduled for cystoscopy on weds\par asa on hold\par still has blood in urine\par no pain, no fever\par thinks 8-9 years ago also had cysto\par no h/o stones\par very anxious re possible cancer\par no anemia\par no weight loss\par otherwise fine\par \par 2/7/20\par pt had f/up with urologist\par result of cysto was diverticuli in bladder\par no more blood in urine\par no dysuria\par no further changes\par otherwise stable\par no chest pain

## 2020-04-01 ENCOUNTER — APPOINTMENT (OUTPATIENT)
Dept: GERIATRICS | Facility: HOME HEALTH | Age: 85
End: 2020-04-01

## 2020-05-09 RX ORDER — VENLAFAXINE 75 MG/1
75 TABLET ORAL DAILY
Qty: 270 | Refills: 3 | Status: DISCONTINUED | COMMUNITY
Start: 1900-01-01 | End: 2020-05-09

## 2020-05-11 ENCOUNTER — RX RENEWAL (OUTPATIENT)
Age: 85
End: 2020-05-11

## 2020-05-20 ENCOUNTER — RX RENEWAL (OUTPATIENT)
Age: 85
End: 2020-05-20

## 2020-08-26 ENCOUNTER — APPOINTMENT (OUTPATIENT)
Dept: GERIATRICS | Facility: HOME HEALTH | Age: 85
End: 2020-08-26
Payer: MEDICARE

## 2020-08-26 VITALS
OXYGEN SATURATION: 94 % | WEIGHT: 165.2 LBS | SYSTOLIC BLOOD PRESSURE: 135 MMHG | TEMPERATURE: 96.9 F | DIASTOLIC BLOOD PRESSURE: 70 MMHG | HEART RATE: 67 BPM

## 2020-08-26 PROCEDURE — G0127 TRIM NAIL(S): CPT

## 2020-08-26 PROCEDURE — 99349 HOME/RES VST EST MOD MDM 40: CPT | Mod: 25

## 2020-08-26 PROCEDURE — 69210 REMOVE IMPACTED EAR WAX UNI: CPT

## 2020-08-26 RX ORDER — ALFUZOSIN HCL 10 MG/1
10 TABLET, EXTENDED RELEASE ORAL
Refills: 0 | Status: DISCONTINUED | COMMUNITY
End: 2020-08-26

## 2020-08-26 NOTE — HISTORY OF PRESENT ILLNESS
[FreeTextEntry1] : pt is a 89 year old man\par has been irritable "terrible mood" - worries\par psychiatrist has not been back to visit\par but psychologist is helpful\par needs renewal of xanax\par asks me to cut nails\par saw pain dr for shot in back again - epidural and\par trigger point shot was very helpful\par went to pain management in [place] - helpful\par \par has chronic cough\par GERD\par burning in chest\par \par \par gets constipated and then hard to urinate\par used two ducolax\par \par to get PT and speech therapist\par \par ankle edema - mild\par \par 4/12/19\par on arrival pt c/o 20 mins of feeling tingly all over - face, extremiities\par also numbness of neck for years\par this resolved while I was there\par \par 4/21/19\par on 4/17/19\par pt had mild chest pain 1-2/10\par aide called 911\par taken to [facility]\par EMS noted  in and out of a fib\par at [place] NSR but elevated troponin to 1.8 peak\par no EKG changes\par BP was high they added amloidipine 5, cozaar 100 and increased toprol from 12.5 to 25 bid\par but hospital f/up yielded BP 95/60 - amlodipiine stopped, cozaar lowered to 50 and toprol back to 25 mg\par currently feels well\par \par In hospital records reviewed\par nuclear stress negative for ischemia\par Echo normal with EF 75%\par no more a fib on monitors\par \par 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 one before and once after had angioplasty\par \par Dr Khan is his cardiologist for many years\par he saw him Friday\par he now has BP monitor\par which I reviewed with him\par \par no more chest pain\par there is shortness of breath on exertion\par to get home PT\par \par 5/24/19\par pt is doing well\par no chest pain - no sob\par has BP log and BP under control\par \par 6/28/19\par pt has been more depressed\par spoke to psychiatrist who spoke to rose mary - suggested increasing effexor to 2 and 1\par asked me to prescribe\par has a psychologist comes weekly to talk\par pt is hopeless, irritable - wore headphones to dinner to block out noise\par \par 8/8/19\par psych dr never followed up\par but pt feels better on new meds\par has psychologist\par no longer depressed just unhappy\par trouble walking to meals\par does not like residence\par 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\par \par 12/14/19\par awoke at 4 am on monday - to painless bloody urine\par no fever\par went to [place] [facility] - UA positive for rbcs, no bacteria - given ceftin\par friday went to  urologist - Dr Chandra\par scheduled for cystoscopy on weds\par asa on hold\par still has blood in urine\par no pain, no fever\par thinks 8-9 years ago also had cysto\par no h/o stones\par very anxious re possible cancer\par no anemia\par no weight loss\par otherwise fine\par \par 2/7/20\par pt had f/up with urologist\par result of cysto was diverticuli in bladder\par no more blood in urine\par no dysuria\par no further changes\par otherwise stable\par no chest pain\par \par 8/26/20\par first visit since pandemic\par has been isolated at [facility] with wife and aide\par is depressed and withdrawn\par has a counseler/therapist that is helpful\par does not want additional antidepressants\par asking for more xanax - so he can sit and sleep\par he already 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\par and has a pressure injury to left hip

## 2020-08-26 NOTE — ASSESSMENT
[FreeTextEntry1] : reviewed medical records from [place]\par scanned in to chart\par \par checked BP with machine  obtained simiiliar readiings several times\par pt knows how to use\par \par pt had negative nuclear stress coco\par currently feels well\par echo wnl\par \par 5/24/19\par pt is doing well\par no chest pain\par to see cardiogist next week\par nails clipped\par \par 8/8/19\par doing better on increased efffexor\par BP is good\par \par \par 10/25/19\par pt is doing better with mood\par BP under control\par no pain\par \par 12/14/19\par cysto - on weds\par cystology sent\par labs reviewed - stable\par no UTI\par BP normal\par \par 2/7/20\par cysto - on diverticuli - no more blood in urine\par BP stable\par no changes made\par \par 8/26/20\par will check home labs\par pt will change position in the night\par allow healing left hip\par BP stable\par has reactive depression - declines adding another med\par ears cleaned of cerumen bilaterally with bionix lit currete\par toe nails clipped

## 2020-08-26 NOTE — PHYSICAL EXAM
[General Appearance - Alert] : alert [General Appearance - In No Acute Distress] : in no acute distress [General Appearance - Well Nourished] : well nourished [General Appearance - Well Developed] : well developed [General Appearance - Well-Appearing] : healthy appearing [Sclera] : the sclera and conjunctiva were normal [PERRL With Normal Accommodation] : pupils were equal in size, round, and reactive to light [Extraocular Movements] : extraocular movements were intact [Normal Oral Mucosa] : normal oral mucosa [No Oral Pallor] : no oral pallor [No Oral Cyanosis] : no oral cyanosis [Outer Ear] : the ears and nose were normal in appearance [Both Tympanic Membranes Were Examined] : both tympanic membranes were normal [Oropharynx] : The oropharynx was normal [Neck Appearance] : the appearance of the neck was normal [Neck Cervical Mass (___cm)] : no neck mass was observed [Jugular Venous Distention Increased] : there was no jugular-venous distention [Thyroid Diffuse Enlargement] : the thyroid was not enlarged [Thyroid Nodule] : there were no palpable thyroid nodules [Auscultation Breath Sounds / Voice Sounds] : lungs were clear to auscultation bilaterally [Heart Rate And Rhythm] : heart rate was normal and rhythm regular [Heart Sounds] : normal S1 and S2 [Heart Sounds Gallop] : no gallops [Murmurs] : no murmurs [Heart Sounds Pericardial Friction Rub] : no pericardial rub [Abdomen Soft] : soft [Abdomen Tenderness] : non-tender [Abdomen Mass (___ Cm)] : no abdominal mass palpated [Cervical Lymph Nodes Enlarged Posterior Bilaterally] : posterior cervical [Cervical Lymph Nodes Enlarged Anterior Bilaterally] : anterior cervical [No CVA Tenderness] : no ~M costovertebral angle tenderness [No Spinal Tenderness] : no spinal tenderness [Involuntary Movements] : no involuntary movements were seen [FreeTextEntry1] : uses walker [Skin Color & Pigmentation] : normal skin color and pigmentation [Skin Turgor] : normal skin turgor [] : no rash [No Focal Deficits] : no focal deficits

## 2020-08-26 NOTE — REVIEW OF SYSTEMS
[Feeling Poorly] : feeling poorly [Feeling Tired] : feeling tired [Recent Weight Loss (___ Lbs)] : recent [unfilled] ~Ulb weight loss [Eyesight Problems] : eyesight problems [Loss Of Hearing] : hearing loss [Arthralgias] : arthralgias [Limb Weakness] : limb weakness [Difficulty Walking] : difficulty walking [Anxiety] : anxiety [Depression] : depression [Muscle Weakness] : muscle weakness [Negative] : Heme/Lymph [de-identified] : uses rollator

## 2020-10-14 ENCOUNTER — APPOINTMENT (OUTPATIENT)
Dept: GERIATRICS | Facility: HOME HEALTH | Age: 85
End: 2020-10-14
Payer: MEDICARE

## 2020-10-14 DIAGNOSIS — L89.222 PRESSURE ULCER OF LEFT HIP, STAGE 2: ICD-10-CM

## 2020-10-14 PROCEDURE — 99349 HOME/RES VST EST MOD MDM 40: CPT

## 2020-10-16 VITALS
DIASTOLIC BLOOD PRESSURE: 70 MMHG | TEMPERATURE: 96.3 F | HEART RATE: 64 BPM | SYSTOLIC BLOOD PRESSURE: 140 MMHG | WEIGHT: 164.8 LBS | OXYGEN SATURATION: 94 %

## 2020-10-16 PROBLEM — L89.222: Status: ACTIVE | Noted: 2020-08-26

## 2020-10-16 NOTE — REVIEW OF SYSTEMS
[Feeling Poorly] : feeling poorly [Feeling Tired] : feeling tired [Loss Of Hearing] : hearing loss [Difficulty Walking] : difficulty walking [Sleep Disturbances] : sleep disturbances [Anxiety] : anxiety [Depression] : depression [Negative] : Heme/Lymph [Suicidal] : not suicidal [de-identified] : sleeps a lot

## 2020-10-16 NOTE — ASSESSMENT
[FreeTextEntry1] : reviewed medical records from [place]\par scanned in to chart\par \par checked BP with machine  obtained simiiliar readiings several times\par pt knows how to use\par \par pt had negative nuclear stress coco\par currently feels well\par echo wnl\par \par 5/24/19\par pt is doing well\par no chest pain\par to see cardiogist next week\par nails clipped\par \par 8/8/19\par doing better on increased efffexor\par BP is good\par \par \par 10/25/19\par pt is doing better with mood\par BP under control\par no pain\par \par 12/14/19\par cysto - on weds\par cystology sent\par labs reviewed - stable\par no UTI\par BP normal\par \par 2/7/20\par cysto - on diverticuli - no more blood in urine\par BP stable\par no changes made\par \par 8/26/20\par will check home labs\par pt will change position in the night\par allow healing left hip\par BP stable\par has reactive depression - declines adding another med\par ears cleaned of cerumen bilaterally with bionix lit currete\par toe nails clipped\par \par 10/14/20\par pt is physically stable\par very depressed over situation\par no changes in meds made\par has therapist

## 2020-10-16 NOTE — HISTORY OF PRESENT ILLNESS
[FreeTextEntry1] : pt is a 89 year old man\par has been irritable "terrible mood" - worries\par psychiatrist has not been back to visit\par but psychologist is helpful\par needs renewal of xanax\par asks me to cut nails\par saw pain dr for shot in back again - epidural and\par trigger point shot was very helpful\par went to pain management in [place] - helpful\par \par has chronic cough\par GERD\par burning in chest\par \par \par gets constipated and then hard to urinate\par used two ducolax\par \par to get PT and speech therapist\par \par ankle edema - mild\par \par 4/12/19\par on arrival pt c/o 20 mins of feeling tingly all over - face, extremiities\par also numbness of neck for years\par this resolved while I was there\par \par 4/21/19\par on 4/17/19\par pt had mild chest pain 1-2/10\par aide called 911\par taken to [facility]\par EMS noted  in and out of a fib\par at [place] NSR but elevated troponin to 1.8 peak\par no EKG changes\par BP was high they added amloidipine 5, cozaar 100 and increased toprol from 12.5 to 25 bid\par but hospital f/up yielded BP 95/60 - amlodipiine stopped, cozaar lowered to 50 and toprol back to 25 mg\par currently feels well\par \par In hospital records reviewed\par nuclear stress negative for ischemia\par Echo normal with EF 75%\par no more a fib on monitors\par \par 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\par \par 6/28/19\par pt has been more depressed\par spoke to psychiatrist who spoke to rose mary - suggested increasing effexor to 2 and 1\par asked me to prescribe\par has a psychologist comes weekly to talk\par pt is hopeless, irritable - wore headphones to dinner to block out noise\par \par 8/8/19\par psych dr never followed up\par but pt feels better on new meds\par has psychologist\par no longer depressed just unhappy\par trouble walking to meals\par does not like residence\par 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\par \par 12/14/19\par awoke at 4 am on monday - to painless bloody urine\par no fever\par went to [place] ER - UA positive for rbcs, no bacteria - given ceftin\par friday went to  urologist - Dr Chandra\par scheduled for cystoscopy on weds\par asa on hold\par still has blood in urine\par no pain, no fever\par thinks 8-9 years ago also had cysto\par no h/o stones\par very anxious re possible cancer\par no anemia\par no weight loss\par otherwise fine\par \par 2/7/20\par pt had f/up with urologist\par result of cysto was diverticuli in bladder\par no more blood in urine\par no dysuria\par no further changes\par otherwise stable\par no chest pain\par \par 8/26/20\par first visit since pandemic\par has been isolated at [facility] with wife and aide\par is depressed and withdrawn\par has a counseler/therapist that is helpful\par does not want additional antidepressants\par asking for more xanax - so he can sit and sleep\par he already 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\par and has a pressure injury to left hip\par \par 10/14/20\par pt feels very depressed\par isolated with covid\par worries re wife\par does not want to change meds- feels situational\par has a psych therapist\par has full time aide\par already had flu vaccine

## 2020-10-16 NOTE — PHYSICAL EXAM
[General Appearance - Alert] : alert [General Appearance - In No Acute Distress] : in no acute distress [General Appearance - Well Nourished] : well nourished [General Appearance - Well-Appearing] : healthy appearing [General Appearance - Well Developed] : well developed [Extraocular Movements] : extraocular movements were intact [Sclera] : the sclera and conjunctiva were normal [PERRL With Normal Accommodation] : pupils were equal in size, round, and reactive to light [Normal Oral Mucosa] : normal oral mucosa [No Oral Cyanosis] : no oral cyanosis [Outer Ear] : the ears and nose were normal in appearance [Oropharynx] : The oropharynx was normal [Neck Appearance] : the appearance of the neck was normal [Jugular Venous Distention Increased] : there was no jugular-venous distention [Neck Cervical Mass (___cm)] : no neck mass was observed [Thyroid Diffuse Enlargement] : the thyroid was not enlarged [Thyroid Nodule] : there were no palpable thyroid nodules [Auscultation Breath Sounds / Voice Sounds] : lungs were clear to auscultation bilaterally [Heart Rate And Rhythm] : heart rate was normal and rhythm regular [Heart Sounds] : normal S1 and S2 [Heart Sounds Gallop] : no gallops [Murmurs] : no murmurs [Heart Sounds Pericardial Friction Rub] : no pericardial rub [Abdomen Tenderness] : non-tender [Cervical Lymph Nodes Enlarged Posterior Bilaterally] : posterior cervical [Cervical Lymph Nodes Enlarged Anterior Bilaterally] : anterior cervical [No CVA Tenderness] : no ~M costovertebral angle tenderness [No Spinal Tenderness] : no spinal tenderness [Involuntary Movements] : no involuntary movements were seen [Skin Color & Pigmentation] : normal skin color and pigmentation [Skin Turgor] : normal skin turgor [] : no rash [No Focal Deficits] : no focal deficits [FreeTextEntry1] : anxious, depressed

## 2020-12-02 ENCOUNTER — APPOINTMENT (OUTPATIENT)
Dept: GERIATRICS | Facility: HOME HEALTH | Age: 85
End: 2020-12-02

## 2020-12-21 NOTE — PHYSICAL EXAM
General Appearance - Alert] : alert [General Appearance - In No Acute Distress] : in no acute distress [General Appearance - Well Nourished] : well nourished [General Appearance - Well Developed] : well developed [General Appearance - Well-Appearing] : healthy appearing [Sclera] : the sclera and conjunctiva were normal [Extraocular Movements] : extraocular movements were intact [PERRL With Normal Accommodation] : pupils were equal in size, round, and reactive to light [Outer Ear] : the ears and nose were normal in appearance [Neck Cervical Mass (___cm)] : no neck mass was observed [Neck Appearance] : the appearance of the neck was normal [Thyroid Diffuse Enlargement] : the thyroid was not enlarged [Thyroid Nodule] : there were no palpable thyroid nodules [Jugular Venous Distention Increased] : there was no jugular-venous distention [Auscultation Breath Sounds / Voice Sounds] : lungs were clear to auscultation bilaterally [Heart Rate And Rhythm] : heart rate was normal and rhythm regular [Heart Sounds Gallop] : no gallops [Heart Sounds] : normal S1 and S2 [Heart Sounds Pericardial Friction Rub] : no pericardial rub [Murmurs] : no murmurs [Involuntary Movements] : no involuntary movements were seen [FreeTextEntry1] : scattered bruises on arms [] : no rash [No Focal Deficits] : no focal deficits

## 2021-01-01 ENCOUNTER — APPOINTMENT (OUTPATIENT)
Dept: GERIATRICS | Facility: HOME HEALTH | Age: 86
End: 2021-01-01
Payer: MEDICARE

## 2021-01-01 ENCOUNTER — RX RENEWAL (OUTPATIENT)
Age: 86
End: 2021-01-01

## 2021-01-01 VITALS
TEMPERATURE: 96.9 F | WEIGHT: 156.8 LBS | OXYGEN SATURATION: 95 % | HEART RATE: 53 BPM | SYSTOLIC BLOOD PRESSURE: 135 MMHG | DIASTOLIC BLOOD PRESSURE: 70 MMHG

## 2021-01-01 VITALS
DIASTOLIC BLOOD PRESSURE: 70 MMHG | WEIGHT: 154.6 LBS | SYSTOLIC BLOOD PRESSURE: 140 MMHG | TEMPERATURE: 96.2 F | HEART RATE: 55 BPM | OXYGEN SATURATION: 97 %

## 2021-01-01 VITALS
TEMPERATURE: 97.1 F | HEART RATE: 61 BPM | OXYGEN SATURATION: 97 % | WEIGHT: 154.2 LBS | SYSTOLIC BLOOD PRESSURE: 135 MMHG | DIASTOLIC BLOOD PRESSURE: 70 MMHG

## 2021-01-01 DIAGNOSIS — N39.0 URINARY TRACT INFECTION, SITE NOT SPECIFIED: ICD-10-CM

## 2021-01-01 DIAGNOSIS — R63.4 ABNORMAL WEIGHT LOSS: ICD-10-CM

## 2021-01-01 DIAGNOSIS — I25.810 ATHEROSCLEROSIS OF CORONARY ARTERY BYPASS GRAFT(S) W/OUT ANGINA PECTORIS: ICD-10-CM

## 2021-01-01 DIAGNOSIS — F32.9 MAJOR DEPRESSIVE DISORDER, SINGLE EPISODE, UNSPECIFIED: ICD-10-CM

## 2021-01-01 DIAGNOSIS — A41.81 SEPSIS DUE TO ENTEROCOCCUS: ICD-10-CM

## 2021-01-01 DIAGNOSIS — Z23 ENCOUNTER FOR IMMUNIZATION: ICD-10-CM

## 2021-01-01 PROCEDURE — 99349 HOME/RES VST EST MOD MDM 40: CPT | Mod: 25

## 2021-01-01 PROCEDURE — 99349 HOME/RES VST EST MOD MDM 40: CPT

## 2021-01-01 PROCEDURE — G0008: CPT

## 2021-01-01 PROCEDURE — 90662 IIV NO PRSV INCREASED AG IM: CPT

## 2021-01-01 RX ORDER — CEPHALEXIN 500 MG/1
500 CAPSULE ORAL 3 TIMES DAILY
Qty: 30 | Refills: 0 | Status: DISCONTINUED | COMMUNITY
Start: 2021-01-01 | End: 2021-01-01

## 2021-03-14 ENCOUNTER — APPOINTMENT (OUTPATIENT)
Dept: GERIATRICS | Facility: HOME HEALTH | Age: 86
End: 2021-03-14
Payer: MEDICARE

## 2021-03-14 PROCEDURE — 99349 HOME/RES VST EST MOD MDM 40: CPT

## 2021-03-14 RX ORDER — HYDROXYCHLOROQUINE SULFATE 200 MG/1
200 TABLET, FILM COATED ORAL
Qty: 120 | Refills: 0 | Status: DISCONTINUED | COMMUNITY
Start: 2020-03-20 | End: 2021-03-14

## 2021-03-14 RX ORDER — MIRTAZAPINE 7.5 MG/1
7.5 TABLET, FILM COATED ORAL
Qty: 90 | Refills: 3 | Status: ACTIVE | COMMUNITY
Start: 2021-02-11 | End: 1900-01-01

## 2021-03-16 VITALS — HEART RATE: 72 BPM | WEIGHT: 168 LBS | OXYGEN SATURATION: 94 % | TEMPERATURE: 97 F

## 2021-03-16 VITALS — SYSTOLIC BLOOD PRESSURE: 130 MMHG | DIASTOLIC BLOOD PRESSURE: 80 MMHG

## 2021-03-16 VITALS — WEIGHT: 158 LBS

## 2021-03-16 NOTE — REVIEW OF SYSTEMS
[Recent Weight Loss (___ Lbs)] : recent [unfilled] ~Ulb weight loss [Eyesight Problems] : eyesight problems [Loss Of Hearing] : hearing loss [Limb Weakness] : limb weakness [Difficulty Walking] : difficulty walking [Negative] : Heme/Lymph

## 2021-03-17 ENCOUNTER — NON-APPOINTMENT (OUTPATIENT)
Age: 86
End: 2021-03-17

## 2021-03-18 VITALS
WEIGHT: 158 LBS | OXYGEN SATURATION: 95 % | TEMPERATURE: 93.7 F | DIASTOLIC BLOOD PRESSURE: 70 MMHG | SYSTOLIC BLOOD PRESSURE: 140 MMHG

## 2021-03-18 NOTE — HISTORY OF PRESENT ILLNESS
[FreeTextEntry1] : pt is a 89 year old man\par has been irritable "terrible mood" - worries\par psychiatrist has not been back to visit\par but psychologist is helpful\par needs renewal of xanax\par asks me to cut nails\par saw pain dr for shot in back again - epidural and\par trigger point shot was very helpful\par went to pain management in [place] - helpful\par \par has chronic cough\par GERD\par burning in chest\par \par \par gets constipated and then hard to urinate\par used two ducolax\par \par to get PT and speech therapist\par \par ankle edema - mild\par \par 4/12/19\par on arrival pt c/o 20 mins of feeling tingly all over - face, extremiities\par also numbness of neck for years\par this resolved while I was there\par \par 4/21/19\par on 4/17/19\par pt had mild chest pain 1-2/10\par aide called 911\par taken to [facility]\par EMS noted  in and out of a fib\par at [place] NSR but elevated troponin to 1.8 peak\par no EKG changes\par BP was high they added amloidipine 5, cozaar 100 and increased toprol from 12.5 to 25 bid\par but hospital f/up yielded BP 95/60 - amlodipiine stopped, cozaar lowered to 50 and toprol back to 25 mg\par currently feels well\par \par In hospital records reviewed\par nuclear stress negative for ischemia\par Echo normal with EF 75%\par no more a fib on monitors\par \par 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\par \par 6/28/19\par pt has been more depressed\par spoke to psychiatrist who spoke to rose mary - suggested increasing effexor to 2 and 1\par asked me to prescribe\par has a psychologist comes weekly to talk\par pt is hopeless, irritable - wore headphones to dinner to block out noise\par \par 8/8/19\par psych dr never followed up\par but pt feels better on new meds\par has psychologist\par no longer depressed just unhappy\par trouble walking to meals\par does not like residence\par 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\par \par 12/14/19\par awoke at 4 am on monday - to painless bloody urine\par no fever\par went to [place] ER - UA positive for rbcs, no bacteria - given ceftin\par friday went to  urologist - Dr Chandra\par scheduled for cystoscopy on weds\par asa on hold\par still has blood in urine\par no pain, no fever\par thinks 8-9 years ago also had cysto\par no h/o stones\par very anxious re possible cancer\par no anemia\par no weight loss\par otherwise fine\par \par 2/7/20\par pt had f/up with urologist\par result of cysto was diverticuli in bladder\par no more blood in urine\par no dysuria\par no further changes\par otherwise stable\par no chest pain\par \par 8/26/20\par first visit since pandemic\par has been isolated at [facility] with wife and aide\par is depressed and withdrawn\par has a counseler/therapist that is helpful\par does not want additional antidepressants\par asking for more xanax - so he can sit and sleep\par he already 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\par and has a pressure injury to left hip\par \par 10/14/20\par pt feels very depressed\par isolated with covid\par worries re wife\par does not want to change meds- feels situational\par has a psych therapist\par has full time aide\par already had flu vaccine\par \par 3/14/21\par pt has had a cough\par aide reports related to eating - pt states he has been instructed in tucking chin and not talking\par while eating\par aide cuts up food in small pieces and soft foods\par not noted to cough in the two hours I was there\par he appears to have lost weight\par he is sedentary\par has had both covid vaccines\par was very depressed - I started mirtazapine and it has helped a great deal\par takes xanax qod\par feels better\par does go to sleep 1 am - 2 pm\par and naps\par although fully alert\par not different

## 2021-03-18 NOTE — ASSESSMENT
[FreeTextEntry1] : reviewed medical records from [place]\par scanned in to chart\par \par checked BP with machine  obtained simiiliar readiings several times\par pt knows how to use\par \par pt had negative nuclear stress coco\par currently feels well\par echo wnl\par \par 5/24/19\par pt is doing well\par no chest pain\par to see cardiogist next week\par nails clipped\par \par 8/8/19\par doing better on increased efffexor\par BP is good\par \par \par 10/25/19\par pt is doing better with mood\par BP under control\par no pain\par \par 12/14/19\par cysto - on weds\par cystology sent\par labs reviewed - stable\par no UTI\par BP normal\par \par 2/7/20\par cysto - on diverticuli - no more blood in urine\par BP stable\par no changes made\par \par 8/26/20\par will check home labs\par pt will change position in the night\par allow healing left hip\par BP stable\par has reactive depression - declines adding another med\par ears cleaned of cerumen bilaterally with bionix lit currete\par toe nails clipped\par \par 10/14/20\par pt is physically stable\par very depressed over situation\par no changes in meds made\par has therapist\par \par 3/14/21\par meds all renewed\par continue mirtazapine - helping depression\par ordering home labs\par ordering home PT\par home cxr - bc of cough - although seems to microaspiration - does not appear to be acute infection

## 2021-03-18 NOTE — PHYSICAL EXAM
[General Appearance - Alert] : alert [General Appearance - In No Acute Distress] : in no acute distress [General Appearance - Well Nourished] : well nourished [General Appearance - Well Developed] : well developed [General Appearance - Well-Appearing] : healthy appearing [Sclera] : the sclera and conjunctiva were normal [PERRL With Normal Accommodation] : pupils were equal in size, round, and reactive to light [Extraocular Movements] : extraocular movements were intact [Normal Oral Mucosa] : normal oral mucosa [No Oral Cyanosis] : no oral cyanosis [Outer Ear] : the ears and nose were normal in appearance [Oropharynx] : The oropharynx was normal [Neck Appearance] : the appearance of the neck was normal [Neck Cervical Mass (___cm)] : no neck mass was observed [Jugular Venous Distention Increased] : there was no jugular-venous distention [Thyroid Diffuse Enlargement] : the thyroid was not enlarged [Thyroid Nodule] : there were no palpable thyroid nodules [Auscultation Breath Sounds / Voice Sounds] : lungs were clear to auscultation bilaterally [Heart Rate And Rhythm] : heart rate was normal and rhythm regular [Heart Sounds] : normal S1 and S2 [Heart Sounds Gallop] : no gallops [Murmurs] : no murmurs [Heart Sounds Pericardial Friction Rub] : no pericardial rub [Edema] : there was no peripheral edema [Abdomen Tenderness] : non-tender [Cervical Lymph Nodes Enlarged Posterior Bilaterally] : posterior cervical [Cervical Lymph Nodes Enlarged Anterior Bilaterally] : anterior cervical [No CVA Tenderness] : no ~M costovertebral angle tenderness [No Spinal Tenderness] : no spinal tenderness [Involuntary Movements] : no involuntary movements were seen [Skin Color & Pigmentation] : normal skin color and pigmentation [Skin Turgor] : normal skin turgor [] : no rash [No Focal Deficits] : no focal deficits [FreeTextEntry1] : anxious, depressed

## 2021-05-19 ENCOUNTER — APPOINTMENT (OUTPATIENT)
Dept: GERIATRICS | Facility: HOME HEALTH | Age: 86
End: 2021-05-19
Payer: MEDICARE

## 2021-05-19 DIAGNOSIS — H91.8X1 OTHER SPECIFIED HEARING LOSS, RIGHT EAR: ICD-10-CM

## 2021-05-19 PROCEDURE — 99349 HOME/RES VST EST MOD MDM 40: CPT

## 2021-05-21 VITALS
HEART RATE: 57 BPM | WEIGHT: 156.4 LBS | DIASTOLIC BLOOD PRESSURE: 70 MMHG | TEMPERATURE: 97.1 F | OXYGEN SATURATION: 93 % | SYSTOLIC BLOOD PRESSURE: 148 MMHG

## 2021-05-21 PROBLEM — H91.8X1: Status: ACTIVE | Noted: 2018-11-21

## 2021-05-21 NOTE — ASSESSMENT
[FreeTextEntry1] : reviewed medical records from [place]\par scanned in to chart\par \par checked BP with machine  obtained simiiliar readiings several times\par pt knows how to use\par \par pt had negative nuclear stress coco\par currently feels well\par echo wnl\par \par 5/24/19\par pt is doing well\par no chest pain\par to see cardiogist next week\par nails clipped\par \par 8/8/19\par doing better on increased efffexor\par BP is good\par \par \par 10/25/19\par pt is doing better with mood\par BP under control\par no pain\par \par 12/14/19\par cysto - on weds\par cystology sent\par labs reviewed - stable\par no UTI\par BP normal\par \par 2/7/20\par cysto - on diverticuli - no more blood in urine\par BP stable\par no changes made\par \par 8/26/20\par will check home labs\par pt will change position in the night\par allow healing left hip\par BP stable\par has reactive depression - declines adding another med\par ears cleaned of cerumen bilaterally with bionix lit currete\par toe nails clipped\par \par 10/14/20\par pt is physically stable\par very depressed over situation\par no changes in meds made\par has therapist\par \par 3/14/21\par meds all renewed\par continue mirtazapine - helping depression\par ordering home labs\par ordering home PT\par home cxr - bc of cough - although seems to microaspiration - does not appear to be acute infection\par \par 5/19/21\par increased mirtazapine\par discussed depression and concerns re aging\par

## 2021-05-21 NOTE — HISTORY OF PRESENT ILLNESS
[FreeTextEntry1] : pt is a 89 year old man\par has been irritable "terrible mood" - worries\par psychiatrist has not been back to visit\par but psychologist is helpful\par needs renewal of xanax\par asks me to cut nails\par saw pain dr for shot in back again - epidural and\par trigger point shot was very helpful\par went to pain management in [place] - helpful\par \par has chronic cough\par GERD\par burning in chest\par \par \par gets constipated and then hard to urinate\par used two ducolax\par \par to get PT and speech therapist\par \par ankle edema - mild\par \par 4/12/19\par on arrival pt c/o 20 mins of feeling tingly all over - face, extremiities\par also numbness of neck for years\par this resolved while I was there\par \par 4/21/19\par on 4/17/19\par pt had mild chest pain 1-2/10\par aide called 911\par taken to [facility]\par EMS noted  in and out of a fib\par at [place] NSR but elevated troponin to 1.8 peak\par no EKG changes\par BP was high they added amloidipine 5, cozaar 100 and increased toprol from 12.5 to 25 bid\par but hospital f/up yielded BP 95/60 - amlodipiine stopped, cozaar lowered to 50 and toprol back to 25 mg\par currently feels well\par \par In hospital records reviewed\par nuclear stress negative for ischemia\par Echo normal with EF 75%\par no more a fib on monitors\par \par 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\par \par 6/28/19\par pt has been more depressed\par spoke to psychiatrist who spoke to rose mary - suggested increasing effexor to 2 and 1\par asked me to prescribe\par has a psychologist comes weekly to talk\par pt is hopeless, irritable - wore headphones to dinner to block out noise\par \par 8/8/19\par psych dr never followed up\par but pt feels better on new meds\par has psychologist\par no longer depressed just unhappy\par trouble walking to meals\par does not like residence\par 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\par \par 12/14/19\par awoke at 4 am on monday - to painless bloody urine\par no fever\par went to [place] ER - UA positive for rbcs, no bacteria - given ceftin\par friday went to  urologist - Dr Chandra\par scheduled for cystoscopy on weds\par asa on hold\par still has blood in urine\par no pain, no fever\par thinks 8-9 years ago also had cysto\par no h/o stones\par very anxious re possible cancer\par no anemia\par no weight loss\par otherwise fine\par \par 2/7/20\par pt had f/up with urologist\par result of cysto was diverticuli in bladder\par no more blood in urine\par no dysuria\par no further changes\par otherwise stable\par no chest pain\par \par 8/26/20\par first visit since pandemic\par has been isolated at [facility] with wife and aide\par is depressed and withdrawn\par has a counseler/therapist that is helpful\par does not want additional antidepressants\par asking for more xanax - so he can sit and sleep\par he already 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\par and has a pressure injury to left hip\par \par 10/14/20\par pt feels very depressed\par isolated with covid\par worries re wife\par does not want to change meds- feels situational\par has a psych therapist\par has full time aide\par already had flu vaccine\par \par 3/14/21\par pt has had a cough\par aide reports related to eating - pt states he has been instructed in tucking chin and not talking\par while eating\par aide cuts up food in small pieces and soft foods\par not noted to cough in the two hours I was there\par he appears to have lost weight\par he is sedentary\par has had both covid vaccines\par was very depressed - I started mirtazapine and it has helped a great deal\par takes xanax qod\par feels better\par does go to sleep 1 am - 2 pm\par and naps\par although fully alert\par not different\par \par 5/19/21\par pt is very unhappy\par sad with wifes decline\par the remeron now up to 15 mg is helping some\par he is home bound, isolated\par 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

## 2021-05-21 NOTE — PHYSICAL EXAM
[General Appearance - Alert] : alert [General Appearance - In No Acute Distress] : in no acute distress [General Appearance - Well Nourished] : well nourished [General Appearance - Well Developed] : well developed [General Appearance - Well-Appearing] : healthy appearing [Sclera] : the sclera and conjunctiva were normal [PERRL With Normal Accommodation] : pupils were equal in size, round, and reactive to light [Extraocular Movements] : extraocular movements were intact [Normal Oral Mucosa] : normal oral mucosa [No Oral Cyanosis] : no oral cyanosis [Outer Ear] : the ears and nose were normal in appearance [Oropharynx] : The oropharynx was normal [Neck Appearance] : the appearance of the neck was normal [Neck Cervical Mass (___cm)] : no neck mass was observed [Jugular Venous Distention Increased] : there was no jugular-venous distention [Thyroid Diffuse Enlargement] : the thyroid was not enlarged [Thyroid Nodule] : there were no palpable thyroid nodules [Auscultation Breath Sounds / Voice Sounds] : lungs were clear to auscultation bilaterally [Heart Rate And Rhythm] : heart rate was normal and rhythm regular [Heart Sounds] : normal S1 and S2 [Heart Sounds Gallop] : no gallops [Murmurs] : no murmurs [Heart Sounds Pericardial Friction Rub] : no pericardial rub [Edema] : there was no peripheral edema [Bowel Sounds] : normal bowel sounds [Abdomen Soft] : soft [Abdomen Tenderness] : non-tender [Abdomen Mass (___ Cm)] : no abdominal mass palpated [Cervical Lymph Nodes Enlarged Posterior Bilaterally] : posterior cervical [Cervical Lymph Nodes Enlarged Anterior Bilaterally] : anterior cervical [No CVA Tenderness] : no ~M costovertebral angle tenderness [No Spinal Tenderness] : no spinal tenderness [Involuntary Movements] : no involuntary movements were seen [Skin Color & Pigmentation] : normal skin color and pigmentation [Skin Turgor] : normal skin turgor [] : no rash [No Focal Deficits] : no focal deficits [FreeTextEntry1] : anxious, depressed

## 2021-05-21 NOTE — REVIEW OF SYSTEMS
[Recent Weight Loss (___ Lbs)] : recent [unfilled] ~Ulb weight loss [Eyesight Problems] : eyesight problems [Discharge From Eyes] : purulent discharge from the eyes [Loss Of Hearing] : hearing loss [Limb Weakness] : limb weakness [Difficulty Walking] : difficulty walking [Anxiety] : anxiety [Depression] : depression [Emotional Problems] : emotional problems [Negative] : Heme/Lymph

## 2021-07-09 ENCOUNTER — RX RENEWAL (OUTPATIENT)
Age: 86
End: 2021-07-09

## 2021-07-16 ENCOUNTER — APPOINTMENT (OUTPATIENT)
Dept: GERIATRICS | Facility: HOME HEALTH | Age: 86
End: 2021-07-16
Payer: MEDICARE

## 2021-07-16 DIAGNOSIS — L60.2 ONYCHOGRYPHOSIS: ICD-10-CM

## 2021-07-16 PROCEDURE — 11719 TRIM NAIL(S) ANY NUMBER: CPT

## 2021-07-16 PROCEDURE — 99349 HOME/RES VST EST MOD MDM 40: CPT | Mod: 25

## 2021-07-18 PROBLEM — L60.2 LONG TOENAIL: Status: ACTIVE | Noted: 2018-11-21

## 2021-07-18 NOTE — REVIEW OF SYSTEMS
[Feeling Poorly] : feeling poorly [Feeling Tired] : feeling tired [Recent Weight Loss (___ Lbs)] : recent [unfilled] ~Ulb weight loss [Loss Of Hearing] : hearing loss [Arthralgias] : arthralgias [Difficulty Walking] : difficulty walking [Anxiety] : anxiety [Depression] : depression [Emotional Problems] : emotional problems [Negative] : Endocrine

## 2021-07-20 VITALS
OXYGEN SATURATION: 93 % | WEIGHT: 156 LBS | SYSTOLIC BLOOD PRESSURE: 140 MMHG | HEART RATE: 60 BPM | TEMPERATURE: 97.1 F | DIASTOLIC BLOOD PRESSURE: 70 MMHG

## 2021-07-20 NOTE — HISTORY OF PRESENT ILLNESS
[FreeTextEntry1] : pt is a 89 year old man\par has been irritable "terrible mood" - worries\par psychiatrist has not been back to visit\par but psychologist is helpful\par needs renewal of xanax\par asks me to cut nails\par saw pain dr for shot in back again - epidural and\par trigger point shot was very helpful\par went to pain management in [place] - helpful\par \par has chronic cough\par GERD\par burning in chest\par \par \par gets constipated and then hard to urinate\par used two ducolax\par \par to get PT and speech therapist\par \par ankle edema - mild\par \par 4/12/19\par on arrival pt c/o 20 mins of feeling tingly all over - face, extremiities\par also numbness of neck for years\par this resolved while I was there\par \par 4/21/19\par on 4/17/19\par pt had mild chest pain 1-2/10\par aide called 911\par taken to [facility]\par EMS noted  in and out of a fib\par at [place] NSR but elevated troponin to 1.8 peak\par no EKG changes\par BP was high they added amloidipine 5, cozaar 100 and increased toprol from 12.5 to 25 bid\par but hospital f/up yielded BP 95/60 - amlodipiine stopped, cozaar lowered to 50 and toprol back to 25 mg\par currently feels well\par \par In hospital records reviewed\par nuclear stress negative for ischemia\par Echo normal with EF 75%\par no more a fib on monitors\par \par 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\par \par 6/28/19\par pt has been more depressed\par spoke to psychiatrist who spoke to rose mary - suggested increasing effexor to 2 and 1\par asked me to prescribe\par has a psychologist comes weekly to talk\par pt is hopeless, irritable - wore headphones to dinner to block out noise\par \par 8/8/19\par psych dr never followed up\par but pt feels better on new meds\par has psychologist\par no longer depressed just unhappy\par trouble walking to meals\par does not like residence\par 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\par \par 12/14/19\par awoke at 4 am on monday - to painless bloody urine\par no fever\par went to [place] ER - UA positive for rbcs, no bacteria - given ceftin\par friday went to  urologist - Dr Chandra\par scheduled for cystoscopy on weds\par asa on hold\par still has blood in urine\par no pain, no fever\par thinks 8-9 years ago also had cysto\par no h/o stones\par very anxious re possible cancer\par no anemia\par no weight loss\par otherwise fine\par \par 2/7/20\par pt had f/up with urologist\par result of cysto was diverticuli in bladder\par no more blood in urine\par no dysuria\par no further changes\par otherwise stable\par no chest pain\par \par 8/26/20\par first visit since pandemic\par has been isolated at [facility] with wife and aide\par is depressed and withdrawn\par has a counseler/therapist that is helpful\par does not want additional antidepressants\par asking for more xanax - so he can sit and sleep\par he already 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\par and has a pressure injury to left hip\par \par 10/14/20\par pt feels very depressed\par isolated with covid\par worries re wife\par does not want to change meds- feels situational\par has a psych therapist\par has full time aide\par already had flu vaccine\par \par 3/14/21\par pt has had a cough\par aide reports related to eating - pt states he has been instructed in tucking chin and not talking\par while eating\par aide cuts up food in small pieces and soft foods\par not noted to cough in the two hours I was there\par he appears to have lost weight\par he is sedentary\par has had both covid vaccines\par was very depressed - I started mirtazapine and it has helped a great deal\par takes xanax qod\par feels better\par does go to sleep 1 am - 2 pm\par and naps\par although fully alert\par not different\par \par 5/19/21\par pt is very unhappy\par sad with wifes decline\par the remeron now up to 15 mg is helping some\par he is home bound, isolated\par 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

## 2021-07-20 NOTE — ASSESSMENT
[FreeTextEntry1] : reviewed medical records from [place]\par scanned in to chart\par \par checked BP with machine  obtained simiiliar readiings several times\par pt knows how to use\par \par pt had negative nuclear stress coco\par currently feels well\par echo wnl\par \par 5/24/19\par pt is doing well\par no chest pain\par to see cardiogist next week\par nails clipped\par \par 8/8/19\par doing better on increased efffexor\par BP is good\par \par \par 10/25/19\par pt is doing better with mood\par BP under control\par no pain\par \par 12/14/19\par cysto - on weds\par cystology sent\par labs reviewed - stable\par no UTI\par BP normal\par \par 2/7/20\par cysto - on diverticuli - no more blood in urine\par BP stable\par no changes made\par \par 8/26/20\par will check home labs\par pt will change position in the night\par allow healing left hip\par BP stable\par has reactive depression - declines adding another med\par ears cleaned of cerumen bilaterally with bionix lit currete\par toe nails clipped\par \par 10/14/20\par pt is physically stable\par very depressed over situation\par no changes in meds made\par has therapist\par \par 3/14/21\par meds all renewed\par continue mirtazapine - helping depression\par ordering home labs\par ordering home PT\par home cxr - bc of cough - although seems to microaspiration - does not appear to be acute infection\par \par 5/19/21\par increased mirtazapine\par discussed depression and concerns re aging\par \par 7/16/21\par urgent visit - concern for slurred speech last night - transiently\par he is completely coherent\par no new neuro findings\par will check home labs\par

## 2021-07-21 ENCOUNTER — APPOINTMENT (OUTPATIENT)
Dept: GERIATRICS | Facility: HOME HEALTH | Age: 86
End: 2021-07-21

## 2021-08-18 ENCOUNTER — RX RENEWAL (OUTPATIENT)
Age: 86
End: 2021-08-18

## 2021-09-14 PROBLEM — A41.81 ENTEROCOCCAL SEPSIS: Status: ACTIVE | Noted: 2021-01-01

## 2021-09-14 NOTE — PHYSICAL EXAM
[General Appearance - Alert] : alert [General Appearance - In No Acute Distress] : in no acute distress [General Appearance - Well Nourished] : well nourished [General Appearance - Well Developed] : well developed [General Appearance - Well-Appearing] : healthy appearing [Sclera] : the sclera and conjunctiva were normal [PERRL With Normal Accommodation] : pupils were equal in size, round, and reactive to light [Extraocular Movements] : extraocular movements were intact [Normal Oral Mucosa] : normal oral mucosa [No Oral Cyanosis] : no oral cyanosis [Outer Ear] : the ears and nose were normal in appearance [Oropharynx] : The oropharynx was normal [Neck Appearance] : the appearance of the neck was normal [Neck Cervical Mass (___cm)] : no neck mass was observed [Jugular Venous Distention Increased] : there was no jugular-venous distention [Thyroid Diffuse Enlargement] : the thyroid was not enlarged [Thyroid Nodule] : there were no palpable thyroid nodules [Auscultation Breath Sounds / Voice Sounds] : lungs were clear to auscultation bilaterally [Heart Sounds] : normal S1 and S2 [Heart Rate And Rhythm] : heart rate was normal and rhythm regular [Heart Sounds Gallop] : no gallops [Murmurs] : no murmurs [Heart Sounds Pericardial Friction Rub] : no pericardial rub [Edema] : there was no peripheral edema [Bowel Sounds] : normal bowel sounds [Abdomen Soft] : soft [Abdomen Tenderness] : non-tender [Abdomen Mass (___ Cm)] : no abdominal mass palpated [Cervical Lymph Nodes Enlarged Posterior Bilaterally] : posterior cervical [Cervical Lymph Nodes Enlarged Anterior Bilaterally] : anterior cervical [No CVA Tenderness] : no ~M costovertebral angle tenderness [No Spinal Tenderness] : no spinal tenderness [Involuntary Movements] : no involuntary movements were seen [Skin Color & Pigmentation] : normal skin color and pigmentation [Skin Turgor] : normal skin turgor [] : no rash [No Focal Deficits] : no focal deficits [FreeTextEntry1] : anxious, depressed

## 2021-09-14 NOTE — ASSESSMENT
[FreeTextEntry1] : reviewed medical records from [place]\par scanned in to chart\par \par checked BP with machine  obtained simiiliar readiings several times\par pt knows how to use\par \par pt had negative nuclear stress coco\par currently feels well\par echo wnl\par \par 5/24/19\par pt is doing well\par no chest pain\par to see cardiogist next week\par nails clipped\par \par 8/8/19\par doing better on increased efffexor\par BP is good\par \par \par 10/25/19\par pt is doing better with mood\par BP under control\par no pain\par \par 12/14/19\par cysto - on weds\par cystology sent\par labs reviewed - stable\par no UTI\par BP normal\par \par 2/7/20\par cysto - on diverticuli - no more blood in urine\par BP stable\par no changes made\par \par 8/26/20\par will check home labs\par pt will change position in the night\par allow healing left hip\par BP stable\par has reactive depression - declines adding another med\par ears cleaned of cerumen bilaterally with bionix lit currete\par toe nails clipped\par \par 10/14/20\par pt is physically stable\par very depressed over situation\par no changes in meds made\par has therapist\par \par 3/14/21\par meds all renewed\par continue mirtazapine - helping depression\par ordering home labs\par ordering home PT\par home cxr - bc of cough - although seems to microaspiration - does not appear to be acute infection\par \par 5/19/21\par increased mirtazapine\par discussed depression and concerns re aging\par \par 7/16/21\par urgent visit - concern for slurred speech last night - transiently\par he is completely coherent\par no new neuro findings\par will check home labs\par \par 9/9/21\par pt finishing pcn po for enterococcus\par doing better\par to do pt\par

## 2021-09-14 NOTE — HISTORY OF PRESENT ILLNESS
[FreeTextEntry1] : pt is a 89 year old man\par has been irritable "terrible mood" - worries\par psychiatrist has not been back to visit\par but psychologist is helpful\par needs renewal of xanax\par asks me to cut nails\par saw pain dr for shot in back again - epidural and\par trigger point shot was very helpful\par went to pain management in [place] - helpful\par \par has chronic cough\par GERD\par burning in chest\par \par \par gets constipated and then hard to urinate\par used two ducolax\par \par to get PT and speech therapist\par \par ankle edema - mild\par \par 4/12/19\par on arrival pt c/o 20 mins of feeling tingly all over - face, extremiities\par also numbness of neck for years\par this resolved while I was there\par \par 4/21/19\par on 4/17/19\par pt had mild chest pain 1-2/10\par aide called 911\par taken to [facility]\par EMS noted  in and out of a fib\par at [place] NSR but elevated troponin to 1.8 peak\par no EKG changes\par BP was high they added amloidipine 5, cozaar 100 and increased toprol from 12.5 to 25 bid\par but hospital f/up yielded BP 95/60 - amlodipiine stopped, cozaar lowered to 50 and toprol back to 25 mg\par currently feels well\par \par In hospital records reviewed\par nuclear stress negative for ischemia\par Echo normal with EF 75%\par no more a fib on monitors\par \par 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\par \par 6/28/19\par pt has been more depressed\par spoke to psychiatrist who spoke to rose mary - suggested increasing effexor to 2 and 1\par asked me to prescribe\par has a psychologist comes weekly to talk\par pt is hopeless, irritable - wore headphones to dinner to block out noise\par \par 8/8/19\par psych dr never followed up\par but pt feels better on new meds\par has psychologist\par no longer depressed just unhappy\par trouble walking to meals\par does not like residence\par 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\par \par 12/14/19\par awoke at 4 am on monday - to painless bloody urine\par no fever\par went to [place] ER - UA positive for rbcs, no bacteria - given ceftin\par friday went to  urologist - Dr Chandra\par scheduled for cystoscopy on weds\par asa on hold\par still has blood in urine\par no pain, no fever\par thinks 8-9 years ago also had cysto\par no h/o stones\par very anxious re possible cancer\par no anemia\par no weight loss\par otherwise fine\par \par 2/7/20\par pt had f/up with urologist\par result of cysto was diverticuli in bladder\par no more blood in urine\par no dysuria\par no further changes\par otherwise stable\par no chest pain\par \par 8/26/20\par first visit since pandemic\par has been isolated at [facility] with wife and aide\par is depressed and withdrawn\par has a counseler/therapist that is helpful\par does not want additional antidepressants\par asking for more xanax - so he can sit and sleep\par he already 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\par and has a pressure injury to left hip\par \par 10/14/20\par pt feels very depressed\par isolated with covid\par worries re wife\par does not want to change meds- feels situational\par has a psych therapist\par has full time aide\par already had flu vaccine\par \par 3/14/21\par pt has had a cough\par aide reports related to eating - pt states he has been instructed in tucking chin and not talking\par while eating\par aide cuts up food in small pieces and soft foods\par not noted to cough in the two hours I was there\par he appears to have lost weight\par he is sedentary\par has had both covid vaccines\par was very depressed - I started mirtazapine and it has helped a great deal\par takes xanax qod\par feels better\par does go to sleep 1 am - 2 pm\par and naps\par although fully alert\par not different\par \par 5/19/21\par pt is very unhappy\par sad with wifes decline\par the remeron now up to 15 mg is helping some\par he is home bound, isolated\par 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\par \par 9/9/21\par s/p hospitalization at [facility]\par was hospitalized for 8 days \par was shaking and had temp 103\par found to have enterococcus sepsis\par is finishing pen vk 500 qid\par home 5 days\par is tired but gradually getting better\par to start pt\par started eating again\par can walk with walker\par mentally improving\par back on same meds

## 2021-10-30 PROBLEM — Z23 ENCOUNTER FOR IMMUNIZATION: Status: ACTIVE | Noted: 2021-01-01

## 2021-10-30 NOTE — HISTORY OF PRESENT ILLNESS
[FreeTextEntry1] : pt is a 89 year old man\par has been irritable "terrible mood" - worries\par psychiatrist has not been back to visit\par but psychologist is helpful\par needs renewal of xanax\par asks me to cut nails\par saw pain dr for shot in back again - epidural and\par trigger point shot was very helpful\par went to pain management in [place] - helpful\par \par has chronic cough\par GERD\par burning in chest\par \par \par gets constipated and then hard to urinate\par used two ducolax\par \par to get PT and speech therapist\par \par ankle edema - mild\par \par 4/12/19\par on arrival pt c/o 20 mins of feeling tingly all over - face, extremiities\par also numbness of neck for years\par this resolved while I was there\par \par 4/21/19\par on 4/17/19\par pt had mild chest pain 1-2/10\par aide called 911\par taken to [facility]\par EMS noted  in and out of a fib\par at [place] NSR but elevated troponin to 1.8 peak\par no EKG changes\par BP was high they added amloidipine 5, cozaar 100 and increased toprol from 12.5 to 25 bid\par but hospital f/up yielded BP 95/60 - amlodipiine stopped, cozaar lowered to 50 and toprol back to 25 mg\par currently feels well\par \par In hospital records reviewed\par nuclear stress negative for ischemia\par Echo normal with EF 75%\par no more a fib on monitors\par \par 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\par \par 6/28/19\par pt has been more depressed\par spoke to psychiatrist who spoke to rose mary - suggested increasing effexor to 2 and 1\par asked me to prescribe\par has a psychologist comes weekly to talk\par pt is hopeless, irritable - wore headphones to dinner to block out noise\par \par 8/8/19\par psych dr never followed up\par but pt feels better on new meds\par has psychologist\par no longer depressed just unhappy\par trouble walking to meals\par does not like residence\par 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\par \par 12/14/19\par awoke at 4 am on monday - to painless bloody urine\par no fever\par went to [place] ER - UA positive for rbcs, no bacteria - given ceftin\par friday went to  urologist - Dr Chandra\par scheduled for cystoscopy on weds\par asa on hold\par still has blood in urine\par no pain, no fever\par thinks 8-9 years ago also had cysto\par no h/o stones\par very anxious re possible cancer\par no anemia\par no weight loss\par otherwise fine\par \par 2/7/20\par pt had f/up with urologist\par result of cysto was diverticuli in bladder\par no more blood in urine\par no dysuria\par no further changes\par otherwise stable\par no chest pain\par \par 8/26/20\par first visit since pandemic\par has been isolated at [facility] with wife and aide\par is depressed and withdrawn\par has a counseler/therapist that is helpful\par does not want additional antidepressants\par asking for more xanax - so he can sit and sleep\par he already 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\par and has a pressure injury to left hip\par \par 10/14/20\par pt feels very depressed\par isolated with covid\par worries re wife\par does not want to change meds- feels situational\par has a psych therapist\par has full time aide\par already had flu vaccine\par \par 3/14/21\par pt has had a cough\par aide reports related to eating - pt states he has been instructed in tucking chin and not talking\par while eating\par aide cuts up food in small pieces and soft foods\par not noted to cough in the two hours I was there\par he appears to have lost weight\par he is sedentary\par has had both covid vaccines\par was very depressed - I started mirtazapine and it has helped a great deal\par takes xanax qod\par feels better\par does go to sleep 1 am - 2 pm\par and naps\par although fully alert\par not different\par \par 5/19/21\par pt is very unhappy\par sad with wifes decline\par the remeron now up to 15 mg is helping some\par he is home bound, isolated\par 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\par \par 9/9/21\par s/p hospitalization at [facility]\par was hospitalized for 8 days \par was shaking and had temp 103\par found to have enterococcus sepsis\par is finishing pen vk 500 qid\par home 5 days\par is tired but gradually getting better\par to start pt\par started eating again\par can walk with walker\par mentally improving\par back on same meds\par \par oct 27/21\par pt seen at home\par s/p hospitalization with bph and uti again \par placed on finasteride\par went to urolgoist last week\par is able to urinate - slow - sits on toilet\par some urgency\par depressed\par did get covid booster\par needs flu vaccine\par is currently on kefelx 250 qid for ingrown toenail from podiatrist\par

## 2021-10-30 NOTE — REVIEW OF SYSTEMS
[Loss Of Hearing] : hearing loss [Arthralgias] : arthralgias [Difficulty Walking] : difficulty walking [Anxiety] : anxiety [Depression] : depression [Emotional Problems] : emotional problems [Negative] : Endocrine [Hesitancy] : urinary hesitancy [Feeling Poorly] : not feeling poorly [Feeling Tired] : not feeling tired [Recent Weight Loss (___ Lbs)] : no recent weight loss

## 2021-10-30 NOTE — ASSESSMENT
[FreeTextEntry1] : reviewed medical records from [place]\par scanned in to chart\par \par checked BP with machine  obtained simiiliar readiings several times\par pt knows how to use\par \par pt had negative nuclear stress coco\par currently feels well\par echo wnl\par \par 5/24/19\par pt is doing well\par no chest pain\par to see cardiogist next week\par nails clipped\par \par 8/8/19\par doing better on increased efffexor\par BP is good\par \par \par 10/25/19\par pt is doing better with mood\par BP under control\par no pain\par \par 12/14/19\par cysto - on weds\par cystology sent\par labs reviewed - stable\par no UTI\par BP normal\par \par 2/7/20\par cysto - on diverticuli - no more blood in urine\par BP stable\par no changes made\par \par 8/26/20\par will check home labs\par pt will change position in the night\par allow healing left hip\par BP stable\par has reactive depression - declines adding another med\par ears cleaned of cerumen bilaterally with bionix lit currete\par toe nails clipped\par \par 10/14/20\par pt is physically stable\par very depressed over situation\par no changes in meds made\par has therapist\par \par 3/14/21\par meds all renewed\par continue mirtazapine - helping depression\par ordering home labs\par ordering home PT\par home cxr - bc of cough - although seems to microaspiration - does not appear to be acute infection\par \par 5/19/21\par increased mirtazapine\par discussed depression and concerns re aging\par \par 7/16/21\par urgent visit - concern for slurred speech last night - transiently\par he is completely coherent\par no new neuro findings\par will check home labs\par \par 9/9/21\par pt finishing pcn po for enterococcus\par doing better\par to do pt\par \par 10/27/21\par flu vaccine given\par now on finasteride for bph\par on keflex for toe \par got booster\par has gained a little weight back and is   feeling better\par

## 2021-11-22 PROBLEM — N39.0 ACUTE LOWER UTI: Status: RESOLVED | Noted: 2021-01-01 | Resolved: 2021-01-01

## 2021-12-20 PROBLEM — F32.9 REACTIVE DEPRESSION: Status: ACTIVE | Noted: 2018-11-21

## 2021-12-20 PROBLEM — R63.4 WEIGHT LOSS: Status: ACTIVE | Noted: 2021-03-16

## 2021-12-20 PROBLEM — I25.810 CORONARY ARTERY DISEASE INVOLVING CORONARY BYPASS GRAFT OF NATIVE HEART WITHOUT ANGINA PECTORIS: Status: ACTIVE | Noted: 2018-11-21

## 2021-12-20 NOTE — REVIEW OF SYSTEMS
[Loss Of Hearing] : hearing loss [Hesitancy] : urinary hesitancy [Arthralgias] : arthralgias [Difficulty Walking] : difficulty walking [Anxiety] : anxiety [Depression] : depression [Emotional Problems] : emotional problems [Negative] : Endocrine [Feeling Poorly] : not feeling poorly [Feeling Tired] : not feeling tired [Recent Weight Loss (___ Lbs)] : no recent weight loss

## 2021-12-20 NOTE — HISTORY OF PRESENT ILLNESS
[FreeTextEntry1] : pt is a 89 year old man\par has been irritable "terrible mood" - worries\par psychiatrist has not been back to visit\par but psychologist is helpful\par needs renewal of xanax\par asks me to cut nails\par saw pain dr for shot in back again - epidural and\par trigger point shot was very helpful\par went to pain management in [place] - helpful\par \par has chronic cough\par GERD\par burning in chest\par \par \par gets constipated and then hard to urinate\par used two ducolax\par \par to get PT and speech therapist\par \par ankle edema - mild\par \par 4/12/19\par on arrival pt c/o 20 mins of feeling tingly all over - face, extremiities\par also numbness of neck for years\par this resolved while I was there\par \par 4/21/19\par on 4/17/19\par pt had mild chest pain 1-2/10\par aide called 911\par taken to [facility]\par EMS noted  in and out of a fib\par at [place] NSR but elevated troponin to 1.8 peak\par no EKG changes\par BP was high they added amloidipine 5, cozaar 100 and increased toprol from 12.5 to 25 bid\par but hospital f/up yielded BP 95/60 - amlodipiine stopped, cozaar lowered to 50 and toprol back to 25 mg\par currently feels well\par \par In hospital records reviewed\par nuclear stress negative for ischemia\par Echo normal with EF 75%\par no more a fib on monitors\par \par 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\par \par 6/28/19\par pt has been more depressed\par spoke to psychiatrist who spoke to rose mary - suggested increasing effexor to 2 and 1\par asked me to prescribe\par has a psychologist comes weekly to talk\par pt is hopeless, irritable - wore headphones to dinner to block out noise\par \par 8/8/19\par psych dr never followed up\par but pt feels better on new meds\par has psychologist\par no longer depressed just unhappy\par trouble walking to meals\par does not like residence\par 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\par \par 12/14/19\par awoke at 4 am on monday - to painless bloody urine\par no fever\par went to [place] ER - UA positive for rbcs, no bacteria - given ceftin\par friday went to  urologist - Dr Chandra\par scheduled for cystoscopy on weds\par asa on hold\par still has blood in urine\par no pain, no fever\par thinks 8-9 years ago also had cysto\par no h/o stones\par very anxious re possible cancer\par no anemia\par no weight loss\par otherwise fine\par \par 2/7/20\par pt had f/up with urologist\par result of cysto was diverticuli in bladder\par no more blood in urine\par no dysuria\par no further changes\par otherwise stable\par no chest pain\par \par 8/26/20\par first visit since pandemic\par has been isolated at [facility] with wife and aide\par is depressed and withdrawn\par has a counseler/therapist that is helpful\par does not want additional antidepressants\par asking for more xanax - so he can sit and sleep\par he already 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\par and has a pressure injury to left hip\par \par 10/14/20\par pt feels very depressed\par isolated with covid\par worries re wife\par does not want to change meds- feels situational\par has a psych therapist\par has full time aide\par already had flu vaccine\par \par 3/14/21\par pt has had a cough\par aide reports related to eating - pt states he has been instructed in tucking chin and not talking\par while eating\par aide cuts up food in small pieces and soft foods\par not noted to cough in the two hours I was there\par he appears to have lost weight\par he is sedentary\par has had both covid vaccines\par was very depressed - I started mirtazapine and it has helped a great deal\par takes xanax qod\par feels better\par does go to sleep 1 am - 2 pm\par and naps\par although fully alert\par not different\par \par 5/19/21\par pt is very unhappy\par sad with wifes decline\par the remeron now up to 15 mg is helping some\par he is home bound, isolated\par 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\par \par 9/9/21\par s/p hospitalization at [facility]\par was hospitalized for 8 days \par was shaking and had temp 103\par found to have enterococcus sepsis\par is finishing pen vk 500 qid\par home 5 days\par is tired but gradually getting better\par to start pt\par started eating again\par can walk with walker\par mentally improving\par back on same meds\par \par oct 27/21\par pt seen at home\par s/p hospitalization with bph and uti again \par placed on finasteride\par went to urolgoist last week\par is able to urinate - slow - sits on toilet\par some urgency\par depressed\par did get covid booster\par needs flu vaccine\par is currently on kefelx 250 qid for ingrown toenail from podiatrist\par \par 12/16/21\par pt seen at home\par he is finding it hard to initiate urination and maintain strong stream despite meds\par for bph\par ua and culture neg for uti\par he is worried that he may need melton or intermittent straight cath\par he has urology f/up 1/5/22\par he is depressed and sleeps a lot\par he enjoys sleeps and has nice dreams\par he had recent neg covid test and has had booster\par he is doing home pt

## 2021-12-20 NOTE — ASSESSMENT
[FreeTextEntry1] : reviewed medical records from [place]\par scanned in to chart\par \par checked BP with machine  obtained simiiliar readiings several times\par pt knows how to use\par \par pt had negative nuclear stress coco\par currently feels well\par echo wnl\par \par 5/24/19\par pt is doing well\par no chest pain\par to see cardiogist next week\par nails clipped\par \par 8/8/19\par doing better on increased efffexor\par BP is good\par \par \par 10/25/19\par pt is doing better with mood\par BP under control\par no pain\par \par 12/14/19\par cysto - on weds\par cystology sent\par labs reviewed - stable\par no UTI\par BP normal\par \par 2/7/20\par cysto - on diverticuli - no more blood in urine\par BP stable\par no changes made\par \par 8/26/20\par will check home labs\par pt will change position in the night\par allow healing left hip\par BP stable\par has reactive depression - declines adding another med\par ears cleaned of cerumen bilaterally with bionix lit currete\par toe nails clipped\par \par 10/14/20\par pt is physically stable\par very depressed over situation\par no changes in meds made\par has therapist\par \par 3/14/21\par meds all renewed\par continue mirtazapine - helping depression\par ordering home labs\par ordering home PT\par home cxr - bc of cough - although seems to microaspiration - does not appear to be acute infection\par \par 5/19/21\par increased mirtazapine\par discussed depression and concerns re aging\par \par 7/16/21\par urgent visit - concern for slurred speech last night - transiently\par he is completely coherent\par no new neuro findings\par will check home labs\par \par 9/9/21\par pt finishing pcn po for enterococcus\par doing better\par to do pt\par \par 10/27/21\par flu vaccine given\par now on finasteride for bph\par on keflex for toe \par got booster\par has gained a little weight back and is   feeling better\par \par 12/16/21\par BPH on meds has urology appt\par ua and culture is neg\par \par bp good\par \par depression on meds\par \par getting pt\par \par got covid booster

## 2022-01-01 ENCOUNTER — RX RENEWAL (OUTPATIENT)
Age: 87
End: 2022-01-01

## 2022-01-01 ENCOUNTER — APPOINTMENT (OUTPATIENT)
Dept: GERIATRICS | Facility: HOME HEALTH | Age: 87
End: 2022-01-01
Payer: MEDICARE

## 2022-01-01 ENCOUNTER — APPOINTMENT (OUTPATIENT)
Dept: GERIATRICS | Facility: HOME HEALTH | Age: 87
End: 2022-01-01

## 2022-01-01 VITALS
OXYGEN SATURATION: 95 % | WEIGHT: 157 LBS | DIASTOLIC BLOOD PRESSURE: 70 MMHG | SYSTOLIC BLOOD PRESSURE: 145 MMHG | HEART RATE: 59 BPM

## 2022-01-01 VITALS
DIASTOLIC BLOOD PRESSURE: 70 MMHG | HEART RATE: 60 BPM | WEIGHT: 157.8 LBS | OXYGEN SATURATION: 95 % | SYSTOLIC BLOOD PRESSURE: 135 MMHG | TEMPERATURE: 96.8 F

## 2022-01-01 DIAGNOSIS — M25.561 PAIN IN RIGHT KNEE: ICD-10-CM

## 2022-01-01 DIAGNOSIS — S10.96XA INSECT BITE OF UNSPECIFIED PART OF NECK, INITIAL ENCOUNTER: ICD-10-CM

## 2022-01-01 DIAGNOSIS — K21.9 GASTRO-ESOPHAGEAL REFLUX DISEASE W/OUT ESOPHAGITIS: ICD-10-CM

## 2022-01-01 DIAGNOSIS — B35.3 TINEA PEDIS: ICD-10-CM

## 2022-01-01 DIAGNOSIS — F41.8 OTHER SPECIFIED ANXIETY DISORDERS: ICD-10-CM

## 2022-01-01 DIAGNOSIS — K02.9 DENTAL CARIES, UNSPECIFIED: ICD-10-CM

## 2022-01-01 DIAGNOSIS — E78.00 PURE HYPERCHOLESTEROLEMIA, UNSPECIFIED: ICD-10-CM
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
## 2022-01-01 DIAGNOSIS — E03.9 HYPOTHYROIDISM, UNSPECIFIED: ICD-10-CM

## 2022-01-01 DIAGNOSIS — I10 ESSENTIAL (PRIMARY) HYPERTENSION: ICD-10-CM

## 2022-01-01 DIAGNOSIS — N40.1 BENIGN PROSTATIC HYPERPLASIA WITH LOWER URINARY TRACT SYMPMS: ICD-10-CM

## 2022-01-01 PROCEDURE — 99349 HOME/RES VST EST MOD MDM 40: CPT

## 2022-01-01 RX ORDER — ATORVASTATIN CALCIUM 40 MG/1
40 TABLET, FILM COATED ORAL
Qty: 90 | Refills: 3 | Status: ACTIVE | COMMUNITY
Start: 2020-01-12 | End: 1900-01-01

## 2022-01-01 RX ORDER — MIRTAZAPINE 15 MG/1
15 TABLET, FILM COATED ORAL
Qty: 90 | Refills: 3 | Status: ACTIVE | COMMUNITY
Start: 2021-04-20 | End: 1900-01-01

## 2022-01-01 RX ORDER — ALPRAZOLAM 0.5 MG/1
0.5 TABLET ORAL TWICE DAILY
Qty: 60 | Refills: 0 | Status: ACTIVE | COMMUNITY
Start: 1900-01-01 | End: 1900-01-01

## 2022-01-01 RX ORDER — ATORVASTATIN CALCIUM 40 MG/1
40 TABLET, FILM COATED ORAL DAILY
Refills: 0 | Status: DISCONTINUED | COMMUNITY
End: 2022-01-01

## 2022-01-01 RX ORDER — RISPERIDONE 0.25 MG/1
0.25 TABLET, FILM COATED ORAL
Qty: 90 | Refills: 3 | Status: ACTIVE | COMMUNITY
Start: 2020-05-11 | End: 1900-01-01

## 2022-01-01 RX ORDER — ALFUZOSIN HYDROCHLORIDE 10 MG/1
10 TABLET, EXTENDED RELEASE ORAL
Qty: 90 | Refills: 3 | Status: ACTIVE | COMMUNITY
Start: 2020-01-12 | End: 1900-01-01

## 2022-01-01 RX ORDER — NYSTATIN 100000 1/G
100000 POWDER TOPICAL
Qty: 1 | Refills: 5 | Status: ACTIVE | COMMUNITY
Start: 2022-01-01 | End: 1900-01-01

## 2022-01-01 RX ORDER — METOPROLOL SUCCINATE 25 MG/1
25 TABLET, EXTENDED RELEASE ORAL DAILY
Qty: 90 | Refills: 3 | Status: ACTIVE | COMMUNITY
Start: 2019-04-22 | End: 1900-01-01

## 2022-01-01 RX ORDER — LOSARTAN POTASSIUM 50 MG/1
50 TABLET, FILM COATED ORAL
Qty: 90 | Refills: 3 | Status: ACTIVE | COMMUNITY
Start: 2019-04-22 | End: 1900-01-01

## 2022-01-01 RX ORDER — VENLAFAXINE HYDROCHLORIDE 75 MG/1
75 CAPSULE, EXTENDED RELEASE ORAL 3 TIMES DAILY
Qty: 270 | Refills: 3 | Status: ACTIVE | COMMUNITY
Start: 2019-02-05 | End: 1900-01-01

## 2022-01-01 RX ORDER — LEVOTHYROXINE SODIUM 0.05 MG/1
50 TABLET ORAL DAILY
Qty: 90 | Refills: 3 | Status: ACTIVE | COMMUNITY
Start: 2020-05-20 | End: 1900-01-01

## 2022-04-10 PROBLEM — K02.9 TOOTH DECAY: Status: ACTIVE | Noted: 2022-01-01

## 2022-04-10 PROBLEM — M25.561 ACUTE PAIN OF RIGHT KNEE: Status: ACTIVE | Noted: 2022-01-01

## 2022-04-10 PROBLEM — E78.00 HIGH CHOLESTEROL: Status: ACTIVE | Noted: 2018-11-21

## 2022-04-10 PROBLEM — K21.9 GASTROESOPHAGEAL REFLUX DISEASE WITHOUT ESOPHAGITIS: Status: ACTIVE | Noted: 2018-11-21

## 2022-04-10 PROBLEM — N40.1 BENIGN PROSTATIC HYPERPLASIA WITH LOWER URINARY TRACT SYMPTOMS: Status: ACTIVE | Noted: 2018-11-21

## 2022-04-10 NOTE — HISTORY OF PRESENT ILLNESS
[FreeTextEntry1] : pt is a 89 year old man\par has been irritable "terrible mood" - worries\par psychiatrist has not been back to visit\par but psychologist is helpful\par needs renewal of xanax\par asks me to cut nails\par saw pain dr for shot in back again - epidural and\par trigger point shot was very helpful\par went to pain management in [place] - helpful\par \par has chronic cough\par GERD\par burning in chest\par \par \par gets constipated and then hard to urinate\par used two ducolax\par \par to get PT and speech therapist\par \par ankle edema - mild\par \par 4/12/19\par on arrival pt c/o 20 mins of feeling tingly all over - face, extremiities\par also numbness of neck for years\par this resolved while I was there\par \par 4/21/19\par on 4/17/19\par pt had mild chest pain 1-2/10\par aide called 911\par taken to [facility]\par EMS noted  in and out of a fib\par at [place] NSR but elevated troponin to 1.8 peak\par no EKG changes\par BP was high they added amloidipine 5, cozaar 100 and increased toprol from 12.5 to 25 bid\par but hospital f/up yielded BP 95/60 - amlodipiine stopped, cozaar lowered to 50 and toprol back to 25 mg\par currently feels well\par \par In hospital records reviewed\par nuclear stress negative for ischemia\par Echo normal with EF 75%\par no more a fib on monitors\par \par 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\par \par 6/28/19\par pt has been more depressed\par spoke to psychiatrist who spoke to rose mary - suggested increasing effexor to 2 and 1\par asked me to prescribe\par has a psychologist comes weekly to talk\par pt is hopeless, irritable - wore headphones to dinner to block out noise\par \par 8/8/19\par psych dr never followed up\par but pt feels better on new meds\par has psychologist\par no longer depressed just unhappy\par trouble walking to meals\par does not like residence\par 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\par \par 12/14/19\par awoke at 4 am on monday - to painless bloody urine\par no fever\par went to [place] ER - UA positive for rbcs, no bacteria - given ceftin\par friday went to  urologist - Dr Chandra\par scheduled for cystoscopy on weds\par asa on hold\par still has blood in urine\par no pain, no fever\par thinks 8-9 years ago also had cysto\par no h/o stones\par very anxious re possible cancer\par no anemia\par no weight loss\par otherwise fine\par \par 2/7/20\par pt had f/up with urologist\par result of cysto was diverticuli in bladder\par no more blood in urine\par no dysuria\par no further changes\par otherwise stable\par no chest pain\par \par 8/26/20\par first visit since pandemic\par has been isolated at [facility] with wife and aide\par is depressed and withdrawn\par has a counseler/therapist that is helpful\par does not want additional antidepressants\par asking for more xanax - so he can sit and sleep\par he already 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\par and has a pressure injury to left hip\par \par 10/14/20\par pt feels very depressed\par isolated with covid\par worries re wife\par does not want to change meds- feels situational\par has a psych therapist\par has full time aide\par already had flu vaccine\par \par 3/14/21\par pt has had a cough\par aide reports related to eating - pt states he has been instructed in tucking chin and not talking\par while eating\par aide cuts up food in small pieces and soft foods\par not noted to cough in the two hours I was there\par he appears to have lost weight\par he is sedentary\par has had both covid vaccines\par was very depressed - I started mirtazapine and it has helped a great deal\par takes xanax qod\par feels better\par does go to sleep 1 am - 2 pm\par and naps\par although fully alert\par not different\par \par 5/19/21\par pt is very unhappy\par sad with wifes decline\par the remeron now up to 15 mg is helping some\par he is home bound, isolated\par 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\par \par 9/9/21\par s/p hospitalization at [facility]\par was hospitalized for 8 days \par was shaking and had temp 103\par found to have enterococcus sepsis\par is finishing pen vk 500 qid\par home 5 days\par is tired but gradually getting better\par to start pt\par started eating again\par can walk with walker\par mentally improving\par back on same meds\par \par oct 27/21\par pt seen at home\par s/p hospitalization with bph and uti again \par placed on finasteride\par went to urolgoist last week\par is able to urinate - slow - sits on toilet\par some urgency\par depressed\par did get covid booster\par needs flu vaccine\par is currently on kefelx 250 qid for ingrown toenail from podiatrist\par \par 12/16/21\par pt seen at home\par he is finding it hard to initiate urination and maintain strong stream despite meds\par for bph\par ua and culture neg for uti\par he is worried that he may need melton or intermittent straight cath\par he has urology f/up 1/5/22\par he is depressed and sleeps a lot\par he enjoys sleeps and has nice dreams\par he had recent neg covid test and has had booster\par he is doing home pt\par \par 4/8/22\par updates -\par \par had urinary retention - now has indwelling melton\par gets catheter changed by uro - done 2 days ago\par \par had tooth ache on left - treated with antibioitics from ER - augmenentin\par \par now dentist suggested pulling of 12 teeth\par \par rt knee pain and swelling\par harder to walk\par \par BP running high in the am\par nurse wants to give meds earlier

## 2022-04-10 NOTE — ASSESSMENT
[FreeTextEntry1] : reviewed medical records from [place]\par scanned in to chart\par \par checked BP with machine  obtained simiiliar readiings several times\par pt knows how to use\par \par pt had negative nuclear stress coco\par currently feels well\par echo wnl\par \par 5/24/19\par pt is doing well\par no chest pain\par to see cardiogist next week\par nails clipped\par \par 8/8/19\par doing better on increased efffexor\par BP is good\par \par \par 10/25/19\par pt is doing better with mood\par BP under control\par no pain\par \par 12/14/19\par cysto - on weds\par cystology sent\par labs reviewed - stable\par no UTI\par BP normal\par \par 2/7/20\par cysto - on diverticuli - no more blood in urine\par BP stable\par no changes made\par \par 8/26/20\par will check home labs\par pt will change position in the night\par allow healing left hip\par BP stable\par has reactive depression - declines adding another med\par ears cleaned of cerumen bilaterally with bionix lit currete\par toe nails clipped\par \par 10/14/20\par pt is physically stable\par very depressed over situation\par no changes in meds made\par has therapist\par \par 3/14/21\par meds all renewed\par continue mirtazapine - helping depression\par ordering home labs\par ordering home PT\par home cxr - bc of cough - although seems to microaspiration - does not appear to be acute infection\par \par 5/19/21\par increased mirtazapine\par discussed depression and concerns re aging\par \par 7/16/21\par urgent visit - concern for slurred speech last night - transiently\par he is completely coherent\par no new neuro findings\par will check home labs\par \par 9/9/21\par pt finishing pcn po for enterococcus\par doing better\par to do pt\par \par 10/27/21\par flu vaccine given\par now on finasteride for bph\par on keflex for toe \par got booster\par has gained a little weight back and is   feeling better\par \par 12/16/21\par BPH on meds has urology appt\par ua and culture is neg\par \par bp good\par \par depression on meds\par \par getting pt\par \par got covid booster\par \par 4/8/22\par covid booster no 4 on tuesday\par has melton regularly changed\par rt knee pain - will get xray - needs orhto - perhaps injections\par visiting dentist\par renewed xanax\par can do bp meds earlier

## 2022-04-10 NOTE — PHYSICAL EXAM
[General Appearance - Alert] : alert [General Appearance - In No Acute Distress] : in no acute distress [General Appearance - Well Nourished] : well nourished [General Appearance - Well Developed] : well developed [General Appearance - Well-Appearing] : healthy appearing [Sclera] : the sclera and conjunctiva were normal [PERRL With Normal Accommodation] : pupils were equal in size, round, and reactive to light [Extraocular Movements] : extraocular movements were intact [Normal Oral Mucosa] : normal oral mucosa [No Oral Cyanosis] : no oral cyanosis [Outer Ear] : the ears and nose were normal in appearance [Oropharynx] : The oropharynx was normal [Neck Cervical Mass (___cm)] : no neck mass was observed [Neck Appearance] : the appearance of the neck was normal [Jugular Venous Distention Increased] : there was no jugular-venous distention [Thyroid Diffuse Enlargement] : the thyroid was not enlarged [Thyroid Nodule] : there were no palpable thyroid nodules [Auscultation Breath Sounds / Voice Sounds] : lungs were clear to auscultation bilaterally [Heart Rate And Rhythm] : heart rate was normal and rhythm regular [Heart Sounds] : normal S1 and S2 [Heart Sounds Gallop] : no gallops [Murmurs] : no murmurs [Heart Sounds Pericardial Friction Rub] : no pericardial rub [Edema] : there was no peripheral edema [Bowel Sounds] : normal bowel sounds [Abdomen Soft] : soft [Abdomen Tenderness] : non-tender [Abdomen Mass (___ Cm)] : no abdominal mass palpated [Cervical Lymph Nodes Enlarged Posterior Bilaterally] : posterior cervical [Cervical Lymph Nodes Enlarged Anterior Bilaterally] : anterior cervical [No CVA Tenderness] : no ~M costovertebral angle tenderness [No Spinal Tenderness] : no spinal tenderness [Involuntary Movements] : no involuntary movements were seen [Skin Color & Pigmentation] : normal skin color and pigmentation [Skin Turgor] : normal skin turgor [] : no rash [No Focal Deficits] : no focal deficits [Alert] : alert [No Acute Distress] : in no acute distress [de-identified] : depressed [de-identified] : decay teeth - broken - left upper and rt lower - no swelling or tenderness [FreeTextEntry1] : anxious, depressed

## 2022-04-10 NOTE — REVIEW OF SYSTEMS
[Loss Of Hearing] : hearing loss [Arthralgias] : arthralgias [Difficulty Walking] : difficulty walking [Anxiety] : anxiety [Depression] : depression [Emotional Problems] : emotional problems [Negative] : Endocrine [Feeling Poorly] : not feeling poorly [Feeling Tired] : not feeling tired [Recent Weight Loss (___ Lbs)] : no recent weight loss [Hesitancy] : no urinary hesitancy [FreeTextEntry4] : tooth decay [FreeTextEntry8] : melton [FreeTextEntry9] : rt knee pain

## 2022-08-06 PROBLEM — E03.9 ACQUIRED HYPOTHYROIDISM: Status: ACTIVE | Noted: 2018-11-21

## 2022-08-06 PROBLEM — S10.96XA INSECT BITE OF NECK, INITIAL ENCOUNTER: Status: ACTIVE | Noted: 2022-01-01

## 2022-08-06 PROBLEM — F41.8 ANXIETY WITH DEPRESSION: Status: ACTIVE | Noted: 2020-09-15

## 2022-08-06 PROBLEM — B35.3 TINEA PEDIS: Status: ACTIVE | Noted: 2022-01-01

## 2022-08-06 PROBLEM — I10 ESSENTIAL HYPERTENSION: Status: ACTIVE | Noted: 2018-11-21

## 2022-08-06 NOTE — ASSESSMENT
[FreeTextEntry1] : reviewed medical records from [place]\par scanned in to chart\par \par checked BP with machine  obtained simiiliar readiings several times\par pt knows how to use\par \par pt had negative nuclear stress coco\par currently feels well\par echo wnl\par \par 5/24/19\par pt is doing well\par no chest pain\par to see cardiogist next week\par nails clipped\par \par 8/8/19\par doing better on increased efffexor\par BP is good\par \par \par 10/25/19\par pt is doing better with mood\par BP under control\par no pain\par \par 12/14/19\par cysto - on weds\par cystology sent\par labs reviewed - stable\par no UTI\par BP normal\par \par 2/7/20\par cysto - on diverticuli - no more blood in urine\par BP stable\par no changes made\par \par 8/26/20\par will check home labs\par pt will change position in the night\par allow healing left hip\par BP stable\par has reactive depression - declines adding another med\par ears cleaned of cerumen bilaterally with bionix lit currete\par toe nails clipped\par \par 10/14/20\par pt is physically stable\par very depressed over situation\par no changes in meds made\par has therapist\par \par 3/14/21\par meds all renewed\par continue mirtazapine - helping depression\par ordering home labs\par ordering home PT\par home cxr - bc of cough - although seems to microaspiration - does not appear to be acute infection\par \par 5/19/21\par increased mirtazapine\par discussed depression and concerns re aging\par \par 7/16/21\par urgent visit - concern for slurred speech last night - transiently\par he is completely coherent\par no new neuro findings\par will check home labs\par \par 9/9/21\par pt finishing pcn po for enterococcus\par doing better\par to do pt\par \par 10/27/21\par flu vaccine given\par now on finasteride for bph\par on keflex for toe \par got booster\par has gained a little weight back and is   feeling better\par \par 12/16/21\par BPH on meds has urology appt\par ua and culture is neg\par \par bp good\par \par depression on meds\par \par getting pt\par \par got covid booster\par \par 4/8/22\par covid booster no 4 on tuesday\par has melton regularly changed\par rt knee pain - will get xray - needs orhto - perhaps injections\par visiting dentist\par renewed xanax\par can do bp meds earlier\par \par 8/4/22\par pt is in better spirits\par has melton\par weight and bp are stable\par has insect bites - using cortisone\par nystop for feet ordered

## 2022-08-06 NOTE — PHYSICAL EXAM
[Alert] : alert [General Appearance - Alert] : alert [General Appearance - In No Acute Distress] : in no acute distress [General Appearance - Well Nourished] : well nourished [General Appearance - Well Developed] : well developed [General Appearance - Well-Appearing] : healthy appearing [Sclera] : the sclera and conjunctiva were normal [PERRL With Normal Accommodation] : pupils were equal in size, round, and reactive to light [Extraocular Movements] : extraocular movements were intact [Normal Oral Mucosa] : normal oral mucosa [No Oral Cyanosis] : no oral cyanosis [Outer Ear] : the ears and nose were normal in appearance [Oropharynx] : The oropharynx was normal [Neck Appearance] : the appearance of the neck was normal [Neck Cervical Mass (___cm)] : no neck mass was observed [Jugular Venous Distention Increased] : there was no jugular-venous distention [Thyroid Diffuse Enlargement] : the thyroid was not enlarged [Thyroid Nodule] : there were no palpable thyroid nodules [Auscultation Breath Sounds / Voice Sounds] : lungs were clear to auscultation bilaterally [Heart Rate And Rhythm] : heart rate was normal and rhythm regular [Heart Sounds] : normal S1 and S2 [Heart Sounds Gallop] : no gallops [Murmurs] : no murmurs [Heart Sounds Pericardial Friction Rub] : no pericardial rub [Edema] : there was no peripheral edema [Bowel Sounds] : normal bowel sounds [Abdomen Tenderness] : non-tender [Abdomen Soft] : soft [Abdomen Mass (___ Cm)] : no abdominal mass palpated [Cervical Lymph Nodes Enlarged Posterior Bilaterally] : posterior cervical [Cervical Lymph Nodes Enlarged Anterior Bilaterally] : anterior cervical [No CVA Tenderness] : no ~M costovertebral angle tenderness [No Spinal Tenderness] : no spinal tenderness [Involuntary Movements] : no involuntary movements were seen [Skin Color & Pigmentation] : normal skin color and pigmentation [Skin Turgor] : normal skin turgor [] : no rash [No Focal Deficits] : no focal deficits [de-identified] : depressed [de-identified] : decay teeth - broken - left upper and rt lower - no swelling or tenderness [FreeTextEntry1] : anxious, depressed

## 2022-08-06 NOTE — REVIEW OF SYSTEMS
[Loss Of Hearing] : hearing loss [Arthralgias] : arthralgias [Difficulty Walking] : difficulty walking [Anxiety] : anxiety [Depression] : depression [Emotional Problems] : emotional problems [Negative] : Endocrine [Feeling Poorly] : not feeling poorly [Feeling Tired] : not feeling tired [Recent Weight Loss (___ Lbs)] : no recent weight loss [Hesitancy] : no urinary hesitancy [FreeTextEntry4] : tooth decay [FreeTextEntry8] : melton [FreeTextEntry9] : uses patch on on knee [de-identified] : feels emotionallly better

## 2022-08-06 NOTE — HISTORY OF PRESENT ILLNESS
[FreeTextEntry1] : pt is a 89 year old man\par has been irritable "terrible mood" - worries\par psychiatrist has not been back to visit\par but psychologist is helpful\par needs renewal of xanax\par asks me to cut nails\par saw pain dr for shot in back again - epidural and\par trigger point shot was very helpful\par went to pain management in [place] - helpful\par \par has chronic cough\par GERD\par burning in chest\par \par \par gets constipated and then hard to urinate\par used two ducolax\par \par to get PT and speech therapist\par \par ankle edema - mild\par \par 4/12/19\par on arrival pt c/o 20 mins of feeling tingly all over - face, extremiities\par also numbness of neck for years\par this resolved while I was there\par \par 4/21/19\par on 4/17/19\par pt had mild chest pain 1-2/10\par aide called 911\par taken to [facility]\par EMS noted  in and out of a fib\par at [place] NSR but elevated troponin to 1.8 peak\par no EKG changes\par BP was high they added amloidipine 5, cozaar 100 and increased toprol from 12.5 to 25 bid\par but hospital f/up yielded BP 95/60 - amlodipiine stopped, cozaar lowered to 50 and toprol back to 25 mg\par currently feels well\par \par In hospital records reviewed\par nuclear stress negative for ischemia\par Echo normal with EF 75%\par no more a fib on monitors\par \par 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\par \par 6/28/19\par pt has been more depressed\par spoke to psychiatrist who spoke to rose mary - suggested increasing effexor to 2 and 1\par asked me to prescribe\par has a psychologist comes weekly to talk\par pt is hopeless, irritable - wore headphones to dinner to block out noise\par \par 8/8/19\par psych dr never followed up\par but pt feels better on new meds\par has psychologist\par no longer depressed just unhappy\par trouble walking to meals\par does not like residence\par 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\par \par 12/14/19\par awoke at 4 am on monday - to painless bloody urine\par no fever\par went to [place] ER - UA positive for rbcs, no bacteria - given ceftin\par friday went to  urologist - Dr Chandra\par scheduled for cystoscopy on weds\par asa on hold\par still has blood in urine\par no pain, no fever\par thinks 8-9 years ago also had cysto\par no h/o stones\par very anxious re possible cancer\par no anemia\par no weight loss\par otherwise fine\par \par 2/7/20\par pt had f/up with urologist\par result of cysto was diverticuli in bladder\par no more blood in urine\par no dysuria\par no further changes\par otherwise stable\par no chest pain\par \par 8/26/20\par first visit since pandemic\par has been isolated at [facility] with wife and aide\par is depressed and withdrawn\par has a counseler/therapist that is helpful\par does not want additional antidepressants\par asking for more xanax - so he can sit and sleep\par he already 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\par and has a pressure injury to left hip\par \par 10/14/20\par pt feels very depressed\par isolated with covid\par worries re wife\par does not want to change meds- feels situational\par has a psych therapist\par has full time aide\par already had flu vaccine\par \par 3/14/21\par pt has had a cough\par aide reports related to eating - pt states he has been instructed in tucking chin and not talking\par while eating\par aide cuts up food in small pieces and soft foods\par not noted to cough in the two hours I was there\par he appears to have lost weight\par he is sedentary\par has had both covid vaccines\par was very depressed - I started mirtazapine and it has helped a great deal\par takes xanax qod\par feels better\par does go to sleep 1 am - 2 pm\par and naps\par although fully alert\par not different\par \par 5/19/21\par pt is very unhappy\par sad with wifes decline\par the remeron now up to 15 mg is helping some\par he is home bound, isolated\par 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\par \par 9/9/21\par s/p hospitalization at [facility]\par was hospitalized for 8 days \par was shaking and had temp 103\par found to have enterococcus sepsis\par is finishing pen vk 500 qid\par home 5 days\par is tired but gradually getting better\par to start pt\par started eating again\par can walk with walker\par mentally improving\par back on same meds\par \par oct 27/21\par pt seen at home\par s/p hospitalization with bph and uti again \par placed on finasteride\par went to urolgoist last week\par is able to urinate - slow - sits on toilet\par some urgency\par depressed\par did get covid booster\par needs flu vaccine\par is currently on kefelx 250 qid for ingrown toenail from podiatrist\par \par 12/16/21\par pt seen at home\par he is finding it hard to initiate urination and maintain strong stream despite meds\par for bph\par ua and culture neg for uti\par he is worried that he may need melton or intermittent straight cath\par he has urology f/up 1/5/22\par he is depressed and sleeps a lot\par he enjoys sleeps and has nice dreams\par he had recent neg covid test and has had booster\par he is doing home pt\par \par 4/8/22\par updates -\par \par had urinary retention - now has indwelling melton\par gets catheter changed by uro - done 2 days ago\par \par had tooth ache on left - treated with antibioitics from ER - augmenentin\par \par now dentist suggested pulling of 12 teeth\par \par rt knee pain and swelling\par harder to walk\par \par BP running high in the am\par nurse wants to give meds earlier\par \par 8/4/22\par pt is happy wife is home from rehab\par he states he is less depressed recently and using less xanax\par he is quieter and less chatty today\par he has a melton now\par he has bugs in apt and has bites on left arm and rt ankle - using cortisone\par extermiinator has come already

## 2022-09-15 ENCOUNTER — APPOINTMENT (OUTPATIENT)
Dept: GERIATRICS | Facility: HOME HEALTH | Age: 87
End: 2022-09-15

## 2024-06-21 NOTE — ASSESSMENT
Patient tolerated venofer infusion without incident. Patient declined AVS but is aware of appointment on 6/28/24 at 11:30AM.   [FreeTextEntry1] : reviewed medical records from [place]\par scanned in to chart\par \par checked BP with machine  obtained simiiliar readiings several times\par pt knows how to use\par \par pt had negative nuclear stress coco\par currently feels well\par echo wnl\par \par 5/24/19\par pt is doing well\par no chest pain\par to see cardiogist next week\par nails clipped\par \par 8/8/19\par doing better on increased efffexor\par BP is good\par \par \par 10/25/19\par pt is doing better with mood\par BP under control\par no pain